# Patient Record
Sex: MALE | Race: ASIAN | NOT HISPANIC OR LATINO
[De-identification: names, ages, dates, MRNs, and addresses within clinical notes are randomized per-mention and may not be internally consistent; named-entity substitution may affect disease eponyms.]

---

## 2017-02-27 ENCOUNTER — LABORATORY RESULT (OUTPATIENT)
Age: 27
End: 2017-02-27

## 2017-02-27 ENCOUNTER — APPOINTMENT (OUTPATIENT)
Dept: NEUROLOGY | Facility: HOSPITAL | Age: 27
End: 2017-02-27

## 2017-02-27 ENCOUNTER — MEDICATION RENEWAL (OUTPATIENT)
Age: 27
End: 2017-02-27

## 2017-02-27 ENCOUNTER — OUTPATIENT (OUTPATIENT)
Dept: OUTPATIENT SERVICES | Facility: HOSPITAL | Age: 27
LOS: 1 days | End: 2017-02-27

## 2017-02-27 ENCOUNTER — RX RENEWAL (OUTPATIENT)
Age: 27
End: 2017-02-27

## 2017-02-27 VITALS — SYSTOLIC BLOOD PRESSURE: 116 MMHG | DIASTOLIC BLOOD PRESSURE: 76 MMHG | HEIGHT: 66 IN | HEART RATE: 90 BPM

## 2017-02-27 DIAGNOSIS — Z98.89 OTHER SPECIFIED POSTPROCEDURAL STATES: Chronic | ICD-10-CM

## 2017-02-27 LAB
CARBAMAZEPINE SERPL-MCNC: 6.7 UG/ML — SIGNIFICANT CHANGE UP (ref 4–12)
PHENOBARB SERPL-MCNC: 35.7 UG/ML — SIGNIFICANT CHANGE UP (ref 10–40)

## 2017-02-28 DIAGNOSIS — G80.9 CEREBRAL PALSY, UNSPECIFIED: ICD-10-CM

## 2017-02-28 DIAGNOSIS — G40.219 LOCALIZATION-RELATED (FOCAL) (PARTIAL) SYMPTOMATIC EPILEPSY AND EPILEPTIC SYNDROMES WITH COMPLEX PARTIAL SEIZURES, INTRACTABLE, WITHOUT STATUS EPILEPTICUS: ICD-10-CM

## 2017-03-08 ENCOUNTER — OUTPATIENT (OUTPATIENT)
Dept: OUTPATIENT SERVICES | Facility: HOSPITAL | Age: 27
LOS: 1 days | End: 2017-03-08
Payer: MEDICARE

## 2017-03-08 VITALS
HEART RATE: 69 BPM | DIASTOLIC BLOOD PRESSURE: 69 MMHG | OXYGEN SATURATION: 98 % | HEIGHT: 68 IN | WEIGHT: 164.91 LBS | RESPIRATION RATE: 16 BRPM | SYSTOLIC BLOOD PRESSURE: 134 MMHG | TEMPERATURE: 98 F

## 2017-03-08 DIAGNOSIS — K02.9 DENTAL CARIES, UNSPECIFIED: ICD-10-CM

## 2017-03-08 DIAGNOSIS — Z98.89 OTHER SPECIFIED POSTPROCEDURAL STATES: Chronic | ICD-10-CM

## 2017-03-08 DIAGNOSIS — F79 UNSPECIFIED INTELLECTUAL DISABILITIES: ICD-10-CM

## 2017-03-08 DIAGNOSIS — Z01.818 ENCOUNTER FOR OTHER PREPROCEDURAL EXAMINATION: ICD-10-CM

## 2017-03-08 DIAGNOSIS — K02.62 DENTAL CARIES ON SMOOTH SURFACE PENETRATING INTO DENTIN: ICD-10-CM

## 2017-03-08 DIAGNOSIS — Z98.890 OTHER SPECIFIED POSTPROCEDURAL STATES: Chronic | ICD-10-CM

## 2017-03-08 LAB
ANION GAP SERPL CALC-SCNC: 14 MMOL/L — SIGNIFICANT CHANGE UP (ref 5–17)
BUN SERPL-MCNC: 11 MG/DL — SIGNIFICANT CHANGE UP (ref 7–23)
CALCIUM SERPL-MCNC: 9.2 MG/DL — SIGNIFICANT CHANGE UP (ref 8.4–10.5)
CHLORIDE SERPL-SCNC: 99 MMOL/L — SIGNIFICANT CHANGE UP (ref 96–108)
CO2 SERPL-SCNC: 24 MMOL/L — SIGNIFICANT CHANGE UP (ref 22–31)
CREAT SERPL-MCNC: 0.63 MG/DL — SIGNIFICANT CHANGE UP (ref 0.5–1.3)
GLUCOSE SERPL-MCNC: 80 MG/DL — SIGNIFICANT CHANGE UP (ref 70–99)
HCT VFR BLD CALC: 43.1 % — SIGNIFICANT CHANGE UP (ref 39–50)
HGB BLD-MCNC: 14.3 G/DL — SIGNIFICANT CHANGE UP (ref 13–17)
MCHC RBC-ENTMCNC: 29.9 PG — SIGNIFICANT CHANGE UP (ref 27–34)
MCHC RBC-ENTMCNC: 33.2 GM/DL — SIGNIFICANT CHANGE UP (ref 32–36)
MCV RBC AUTO: 90.2 FL — SIGNIFICANT CHANGE UP (ref 80–100)
PLATELET # BLD AUTO: 260 K/UL — SIGNIFICANT CHANGE UP (ref 150–400)
POTASSIUM SERPL-MCNC: 3.8 MMOL/L — SIGNIFICANT CHANGE UP (ref 3.5–5.3)
POTASSIUM SERPL-SCNC: 3.8 MMOL/L — SIGNIFICANT CHANGE UP (ref 3.5–5.3)
RBC # BLD: 4.78 M/UL — SIGNIFICANT CHANGE UP (ref 4.2–5.8)
RBC # FLD: 13.5 % — SIGNIFICANT CHANGE UP (ref 10.3–14.5)
SODIUM SERPL-SCNC: 137 MMOL/L — SIGNIFICANT CHANGE UP (ref 135–145)
WBC # BLD: 5.62 K/UL — SIGNIFICANT CHANGE UP (ref 3.8–10.5)
WBC # FLD AUTO: 5.62 K/UL — SIGNIFICANT CHANGE UP (ref 3.8–10.5)

## 2017-03-08 PROCEDURE — 85027 COMPLETE CBC AUTOMATED: CPT

## 2017-03-08 PROCEDURE — 80048 BASIC METABOLIC PNL TOTAL CA: CPT

## 2017-03-08 NOTE — H&P PST ADULT - NEGATIVE GENERAL SYMPTOMS
no polyphagia/no polyuria/no polydipsia/no anorexia/no chills/no sweating/no malaise/no fever/no fatigue

## 2017-03-08 NOTE — H&P PST ADULT - HISTORY OF PRESENT ILLNESS
26 y.o. male with h/o mental retardation, cerebral palsy, epilepsy (typically has 1-2 episodes of GTCs every night while sleeping and occasional day time partial seizures) presents to PST with sister for pre-op evaluation for comprehensive dental treatment scheduled for 03/22/2017.

## 2017-03-08 NOTE — H&P PST ADULT - FAMILY HISTORY
Father  Still living? Yes, Estimated age: 61-70  Family history of diabetes mellitus, Age at diagnosis: Age Unknown

## 2017-03-08 NOTE — H&P PST ADULT - NSANTHOSAYNRD_GEN_A_CORE
No. ROLANDO screening performed.  STOP BANG Legend: 0-2 = LOW Risk; 3-4 = INTERMEDIATE Risk; 5-8 = HIGH Risk

## 2017-03-09 ENCOUNTER — CHART COPY (OUTPATIENT)
Age: 27
End: 2017-03-09

## 2017-03-14 ENCOUNTER — OTHER (OUTPATIENT)
Age: 27
End: 2017-03-14

## 2017-03-30 ENCOUNTER — APPOINTMENT (OUTPATIENT)
Dept: NEUROSURGERY | Facility: CLINIC | Age: 27
End: 2017-03-30

## 2017-03-30 VITALS
WEIGHT: 158 LBS | HEIGHT: 66 IN | BODY MASS INDEX: 25.39 KG/M2 | DIASTOLIC BLOOD PRESSURE: 72 MMHG | SYSTOLIC BLOOD PRESSURE: 118 MMHG | HEART RATE: 88 BPM

## 2017-05-10 ENCOUNTER — OUTPATIENT (OUTPATIENT)
Dept: OUTPATIENT SERVICES | Facility: HOSPITAL | Age: 27
LOS: 1 days | End: 2017-05-10
Payer: MEDICARE

## 2017-05-10 VITALS
HEART RATE: 88 BPM | WEIGHT: 160.06 LBS | RESPIRATION RATE: 20 BRPM | SYSTOLIC BLOOD PRESSURE: 102 MMHG | TEMPERATURE: 99 F | HEIGHT: 66 IN | DIASTOLIC BLOOD PRESSURE: 70 MMHG | OXYGEN SATURATION: 98 %

## 2017-05-10 DIAGNOSIS — G40.219 LOCALIZATION-RELATED (FOCAL) (PARTIAL) SYMPTOMATIC EPILEPSY AND EPILEPTIC SYNDROMES WITH COMPLEX PARTIAL SEIZURES, INTRACTABLE, WITHOUT STATUS EPILEPTICUS: ICD-10-CM

## 2017-05-10 DIAGNOSIS — Z98.890 OTHER SPECIFIED POSTPROCEDURAL STATES: Chronic | ICD-10-CM

## 2017-05-10 DIAGNOSIS — G40.909 EPILEPSY, UNSPECIFIED, NOT INTRACTABLE, WITHOUT STATUS EPILEPTICUS: ICD-10-CM

## 2017-05-10 DIAGNOSIS — Z01.818 ENCOUNTER FOR OTHER PREPROCEDURAL EXAMINATION: ICD-10-CM

## 2017-05-10 LAB
ANION GAP SERPL CALC-SCNC: 20 MMOL/L — HIGH (ref 5–17)
BUN SERPL-MCNC: 12 MG/DL — SIGNIFICANT CHANGE UP (ref 7–23)
CALCIUM SERPL-MCNC: 9.7 MG/DL — SIGNIFICANT CHANGE UP (ref 8.4–10.5)
CHLORIDE SERPL-SCNC: 99 MMOL/L — SIGNIFICANT CHANGE UP (ref 96–108)
CO2 SERPL-SCNC: 21 MMOL/L — LOW (ref 22–31)
CREAT SERPL-MCNC: 0.7 MG/DL — SIGNIFICANT CHANGE UP (ref 0.5–1.3)
GLUCOSE SERPL-MCNC: 83 MG/DL — SIGNIFICANT CHANGE UP (ref 70–99)
HCT VFR BLD CALC: 42.5 % — SIGNIFICANT CHANGE UP (ref 39–50)
HGB BLD-MCNC: 14.3 G/DL — SIGNIFICANT CHANGE UP (ref 13–17)
MCHC RBC-ENTMCNC: 29.9 PG — SIGNIFICANT CHANGE UP (ref 27–34)
MCHC RBC-ENTMCNC: 33.6 GM/DL — SIGNIFICANT CHANGE UP (ref 32–36)
MCV RBC AUTO: 88.9 FL — SIGNIFICANT CHANGE UP (ref 80–100)
PLATELET # BLD AUTO: 252 K/UL — SIGNIFICANT CHANGE UP (ref 150–400)
POTASSIUM SERPL-MCNC: 4 MMOL/L — SIGNIFICANT CHANGE UP (ref 3.5–5.3)
POTASSIUM SERPL-SCNC: 4 MMOL/L — SIGNIFICANT CHANGE UP (ref 3.5–5.3)
RBC # BLD: 4.78 M/UL — SIGNIFICANT CHANGE UP (ref 4.2–5.8)
RBC # FLD: 12.9 % — SIGNIFICANT CHANGE UP (ref 10.3–14.5)
SODIUM SERPL-SCNC: 140 MMOL/L — SIGNIFICANT CHANGE UP (ref 135–145)
WBC # BLD: 5.42 K/UL — SIGNIFICANT CHANGE UP (ref 3.8–10.5)
WBC # FLD AUTO: 5.42 K/UL — SIGNIFICANT CHANGE UP (ref 3.8–10.5)

## 2017-05-10 PROCEDURE — 80048 BASIC METABOLIC PNL TOTAL CA: CPT

## 2017-05-10 PROCEDURE — 85027 COMPLETE CBC AUTOMATED: CPT

## 2017-05-10 RX ORDER — PHENOBARBITAL 60 MG
1 TABLET ORAL
Qty: 0 | Refills: 0 | COMMUNITY

## 2017-05-10 NOTE — H&P PST ADULT - HISTORY OF PRESENT ILLNESS
26 y.o. male with h/o mental retardation, cerebral palsy, epilepsy (typically has 1-2 episodes of GTCs every night while sleeping and occasional day time partial seizures) presents to PST with sister for pre-op evaluation for comprehensive dental treatment scheduled for 03/22/2017. 26 y.o. male with h/o developmental delay, cerebral palsy, epilepsy (typically has 1-2 episodes of GTCs every night while sleeping and occasional day time partial seizures) presents to PST for vagal nerve stimulator for seizure control. Pt using wheelchair at PST, but can crawl at home. needs help with bathing, dressing and some help with feeding.

## 2017-05-10 NOTE — H&P PST ADULT - PMH
CP (cerebral palsy)    Dental caries    Epilepsy  GTCs, patrial seisures  MR (mental retardation) CP (cerebral palsy)    Dental caries    Epilepsy  GTCs, partial seisures  MR (mental retardation)

## 2017-05-17 ENCOUNTER — RX RENEWAL (OUTPATIENT)
Age: 27
End: 2017-05-17

## 2017-05-18 ENCOUNTER — MEDICATION RENEWAL (OUTPATIENT)
Age: 27
End: 2017-05-18

## 2017-05-22 ENCOUNTER — APPOINTMENT (OUTPATIENT)
Dept: NEUROLOGY | Facility: HOSPITAL | Age: 27
End: 2017-05-22

## 2017-05-22 ENCOUNTER — OUTPATIENT (OUTPATIENT)
Dept: OUTPATIENT SERVICES | Facility: HOSPITAL | Age: 27
LOS: 1 days | End: 2017-05-22
Payer: MEDICARE

## 2017-05-22 ENCOUNTER — TRANSCRIPTION ENCOUNTER (OUTPATIENT)
Age: 27
End: 2017-05-22

## 2017-05-22 ENCOUNTER — APPOINTMENT (OUTPATIENT)
Dept: NEUROSURGERY | Facility: HOSPITAL | Age: 27
End: 2017-05-22

## 2017-05-22 VITALS
OXYGEN SATURATION: 98 % | HEART RATE: 89 BPM | RESPIRATION RATE: 6 BRPM | DIASTOLIC BLOOD PRESSURE: 55 MMHG | TEMPERATURE: 97 F | SYSTOLIC BLOOD PRESSURE: 100 MMHG

## 2017-05-22 VITALS
DIASTOLIC BLOOD PRESSURE: 72 MMHG | HEART RATE: 92 BPM | OXYGEN SATURATION: 100 % | SYSTOLIC BLOOD PRESSURE: 104 MMHG | HEIGHT: 66 IN | RESPIRATION RATE: 18 BRPM | TEMPERATURE: 99 F | WEIGHT: 160.06 LBS

## 2017-05-22 DIAGNOSIS — G40.219 LOCALIZATION-RELATED (FOCAL) (PARTIAL) SYMPTOMATIC EPILEPSY AND EPILEPTIC SYNDROMES WITH COMPLEX PARTIAL SEIZURES, INTRACTABLE, WITHOUT STATUS EPILEPTICUS: ICD-10-CM

## 2017-05-22 DIAGNOSIS — Z98.890 OTHER SPECIFIED POSTPROCEDURAL STATES: Chronic | ICD-10-CM

## 2017-05-22 PROCEDURE — C1767: CPT

## 2017-05-22 PROCEDURE — 64568 OPN IMPLTJ CRNL NRV NEA&PG: CPT

## 2017-05-22 PROCEDURE — C1778: CPT

## 2017-05-22 PROCEDURE — 95974: CPT | Mod: 52

## 2017-05-22 RX ORDER — DEXTROSE MONOHYDRATE, SODIUM CHLORIDE, AND POTASSIUM CHLORIDE 50; .745; 4.5 G/1000ML; G/1000ML; G/1000ML
1000 INJECTION, SOLUTION INTRAVENOUS
Qty: 0 | Refills: 0 | Status: DISCONTINUED | OUTPATIENT
Start: 2017-05-22 | End: 2017-06-06

## 2017-05-22 RX ORDER — HYDROMORPHONE HYDROCHLORIDE 2 MG/ML
0.25 INJECTION INTRAMUSCULAR; INTRAVENOUS; SUBCUTANEOUS
Qty: 0 | Refills: 0 | Status: DISCONTINUED | OUTPATIENT
Start: 2017-05-22 | End: 2017-05-22

## 2017-05-22 RX ORDER — CEPHALEXIN 500 MG
500 CAPSULE ORAL EVERY 12 HOURS
Qty: 0 | Refills: 0 | Status: DISCONTINUED | OUTPATIENT
Start: 2017-05-22 | End: 2017-06-06

## 2017-05-22 RX ORDER — ONDANSETRON 8 MG/1
4 TABLET, FILM COATED ORAL ONCE
Qty: 0 | Refills: 0 | Status: DISCONTINUED | OUTPATIENT
Start: 2017-05-22 | End: 2017-05-22

## 2017-05-22 RX ORDER — CEPHALEXIN 500 MG
1 CAPSULE ORAL
Qty: 6 | Refills: 0
Start: 2017-05-22 | End: 2017-05-25

## 2017-05-22 RX ADMIN — DEXTROSE MONOHYDRATE, SODIUM CHLORIDE, AND POTASSIUM CHLORIDE 75 MILLILITER(S): 50; .745; 4.5 INJECTION, SOLUTION INTRAVENOUS at 11:40

## 2017-05-22 NOTE — ASU DISCHARGE PLAN (ADULT/PEDIATRIC). - NOTIFY
Inability to Tolerate Liquids or Foods/Bleeding that does not stop/Persistent Nausea and Vomiting/Fever greater than 101

## 2017-05-22 NOTE — ASU DISCHARGE PLAN (ADULT/PEDIATRIC). - MEDICATION SUMMARY - MEDICATIONS TO TAKE
I will START or STAY ON the medications listed below when I get home from the hospital:    Keppra  -- 1000milligram(s) by mouth in am  2000 mg at bedtime  -- Indication: For Localization-related symptomatic epilepsy and epileptic syndromes with complex partial seizures, intractable, without status epilepticus    Carbatrol 200 mg oral capsule, extended release  -- 1 cap(s) by mouth 2 times a day  -- Indication: For Localization-related symptomatic epilepsy and epileptic syndromes with complex partial seizures, intractable, without status epilepticus    PHENobarbital 97.2 mg oral tablet  -- 1 tab(s) by mouth 2 times a day  -- Indication: For Localization-related symptomatic epilepsy and epileptic syndromes with complex partial seizures, intractable, without status epilepticus I will START or STAY ON the medications listed below when I get home from the hospital:    Keppra  -- 1000milligram(s) by mouth in am  2000 mg at bedtime  -- Indication: For Localization-related symptomatic epilepsy and epileptic syndromes with complex partial seizures, intractable, without status epilepticus    Carbatrol 200 mg oral capsule, extended release  -- 1 cap(s) by mouth 2 times a day  -- Indication: For Localization-related symptomatic epilepsy and epileptic syndromes with complex partial seizures, intractable, without status epilepticus    PHENobarbital 97.2 mg oral tablet  -- 1 tab(s) by mouth 2 times a day  -- Indication: For Localization-related symptomatic epilepsy and epileptic syndromes with complex partial seizures, intractable, without status epilepticus    Keflex 500 mg oral capsule  -- 1 cap(s) by mouth every 12 hours MDD:2  tablets  -- Indication: For Localization-related symptomatic epilepsy and epileptic syndromes with complex partial seizures, intractable, without status epilepticus

## 2017-05-22 NOTE — ASU DISCHARGE PLAN (ADULT/PEDIATRIC). - NURSING INSTRUCTIONS
family understand discharge instructions and verbalized understanding. pt safety maintained and has met discharge criteria.

## 2017-05-22 NOTE — BRIEF OPERATIVE NOTE - PRE-OP DX
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

## 2017-06-01 ENCOUNTER — APPOINTMENT (OUTPATIENT)
Dept: NEUROSURGERY | Facility: CLINIC | Age: 27
End: 2017-06-01

## 2017-06-01 VITALS — DIASTOLIC BLOOD PRESSURE: 74 MMHG | HEART RATE: 68 BPM | SYSTOLIC BLOOD PRESSURE: 120 MMHG

## 2017-06-04 ENCOUNTER — TRANSCRIPTION ENCOUNTER (OUTPATIENT)
Age: 27
End: 2017-06-04

## 2017-06-26 ENCOUNTER — APPOINTMENT (OUTPATIENT)
Dept: NEUROLOGY | Facility: HOSPITAL | Age: 27
End: 2017-06-26

## 2017-06-26 ENCOUNTER — OUTPATIENT (OUTPATIENT)
Dept: OUTPATIENT SERVICES | Facility: HOSPITAL | Age: 27
LOS: 1 days | End: 2017-06-26

## 2017-06-26 VITALS — HEIGHT: 66 IN | SYSTOLIC BLOOD PRESSURE: 99 MMHG | HEART RATE: 68 BPM | DIASTOLIC BLOOD PRESSURE: 57 MMHG

## 2017-06-26 DIAGNOSIS — R62.50 UNSPECIFIED LACK OF EXPECTED NORMAL PHYSIOLOGICAL DEVELOPMENT IN CHILDHOOD: ICD-10-CM

## 2017-06-26 DIAGNOSIS — Z98.890 OTHER SPECIFIED POSTPROCEDURAL STATES: Chronic | ICD-10-CM

## 2017-06-27 DIAGNOSIS — R62.50 UNSPECIFIED LACK OF EXPECTED NORMAL PHYSIOLOGICAL DEVELOPMENT IN CHILDHOOD: ICD-10-CM

## 2017-06-27 DIAGNOSIS — G40.909 EPILEPSY, UNSPECIFIED, NOT INTRACTABLE, WITHOUT STATUS EPILEPTICUS: ICD-10-CM

## 2017-07-17 ENCOUNTER — RX RENEWAL (OUTPATIENT)
Age: 27
End: 2017-07-17

## 2017-08-28 ENCOUNTER — APPOINTMENT (OUTPATIENT)
Dept: NEUROLOGY | Facility: HOSPITAL | Age: 27
End: 2017-08-28

## 2017-09-25 ENCOUNTER — OUTPATIENT (OUTPATIENT)
Dept: OUTPATIENT SERVICES | Facility: HOSPITAL | Age: 27
LOS: 1 days | End: 2017-09-25

## 2017-09-25 ENCOUNTER — APPOINTMENT (OUTPATIENT)
Dept: NEUROLOGY | Facility: HOSPITAL | Age: 27
End: 2017-09-25

## 2017-09-25 VITALS
HEART RATE: 68 BPM | BODY MASS INDEX: 26.66 KG/M2 | SYSTOLIC BLOOD PRESSURE: 116 MMHG | DIASTOLIC BLOOD PRESSURE: 67 MMHG | WEIGHT: 160 LBS | HEIGHT: 65 IN

## 2017-09-25 DIAGNOSIS — G40.909 EPILEPSY, UNSPECIFIED, NOT INTRACTABLE, WITHOUT STATUS EPILEPTICUS: ICD-10-CM

## 2017-09-25 DIAGNOSIS — Z98.890 OTHER SPECIFIED POSTPROCEDURAL STATES: Chronic | ICD-10-CM

## 2017-10-04 ENCOUNTER — APPOINTMENT (OUTPATIENT)
Dept: NEUROLOGY | Facility: CLINIC | Age: 27
End: 2017-10-04

## 2017-10-04 ENCOUNTER — APPOINTMENT (OUTPATIENT)
Age: 27
End: 2017-10-04
Payer: MEDICARE

## 2017-10-04 VITALS — SYSTOLIC BLOOD PRESSURE: 115 MMHG | DIASTOLIC BLOOD PRESSURE: 70 MMHG | HEART RATE: 74 BPM

## 2017-10-04 PROCEDURE — 95974: CPT

## 2017-10-04 PROCEDURE — 99214 OFFICE O/P EST MOD 30 MIN: CPT | Mod: 25

## 2017-10-11 ENCOUNTER — APPOINTMENT (OUTPATIENT)
Dept: NEUROLOGY | Facility: CLINIC | Age: 27
End: 2017-10-11

## 2017-10-25 ENCOUNTER — APPOINTMENT (OUTPATIENT)
Dept: NEUROLOGY | Facility: CLINIC | Age: 27
End: 2017-10-25
Payer: MEDICARE

## 2017-10-25 VITALS
BODY MASS INDEX: 26.66 KG/M2 | HEART RATE: 84 BPM | WEIGHT: 160 LBS | SYSTOLIC BLOOD PRESSURE: 154 MMHG | DIASTOLIC BLOOD PRESSURE: 75 MMHG | HEIGHT: 65 IN

## 2017-10-25 PROCEDURE — 99214 OFFICE O/P EST MOD 30 MIN: CPT

## 2017-10-25 PROCEDURE — 95974: CPT

## 2017-11-09 NOTE — H&P PST ADULT - HEART RATE (BEATS/MIN)
no dysuria/no abd pain, no urinary frequency/no vomiting/no hematuria/no nausea/no burning urination 88

## 2017-11-22 ENCOUNTER — APPOINTMENT (OUTPATIENT)
Dept: NEUROLOGY | Facility: CLINIC | Age: 27
End: 2017-11-22

## 2017-12-04 ENCOUNTER — APPOINTMENT (OUTPATIENT)
Dept: NEUROLOGY | Facility: CLINIC | Age: 27
End: 2017-12-04
Payer: MEDICARE

## 2017-12-04 VITALS
HEIGHT: 65 IN | SYSTOLIC BLOOD PRESSURE: 106 MMHG | HEART RATE: 76 BPM | DIASTOLIC BLOOD PRESSURE: 63 MMHG | WEIGHT: 165 LBS | BODY MASS INDEX: 27.49 KG/M2

## 2017-12-04 PROCEDURE — 95974: CPT

## 2017-12-04 PROCEDURE — 99214 OFFICE O/P EST MOD 30 MIN: CPT

## 2017-12-20 ENCOUNTER — APPOINTMENT (OUTPATIENT)
Dept: NEUROLOGY | Facility: CLINIC | Age: 27
End: 2017-12-20

## 2018-01-24 ENCOUNTER — APPOINTMENT (OUTPATIENT)
Dept: NEUROLOGY | Facility: CLINIC | Age: 28
End: 2018-01-24
Payer: MEDICARE

## 2018-01-24 VITALS
HEART RATE: 78 BPM | HEIGHT: 66 IN | BODY MASS INDEX: 26.52 KG/M2 | DIASTOLIC BLOOD PRESSURE: 71 MMHG | SYSTOLIC BLOOD PRESSURE: 107 MMHG | WEIGHT: 165 LBS

## 2018-01-24 PROCEDURE — 95974: CPT

## 2018-01-24 PROCEDURE — 99214 OFFICE O/P EST MOD 30 MIN: CPT

## 2018-01-31 ENCOUNTER — RX RENEWAL (OUTPATIENT)
Age: 28
End: 2018-01-31

## 2018-02-09 ENCOUNTER — APPOINTMENT (OUTPATIENT)
Dept: NEUROLOGY | Facility: CLINIC | Age: 28
End: 2018-02-09

## 2018-02-28 ENCOUNTER — APPOINTMENT (OUTPATIENT)
Dept: NEUROLOGY | Facility: CLINIC | Age: 28
End: 2018-02-28

## 2018-02-28 ENCOUNTER — APPOINTMENT (OUTPATIENT)
Dept: NEUROLOGY | Facility: CLINIC | Age: 28
End: 2018-02-28
Payer: MEDICARE

## 2018-02-28 VITALS
HEIGHT: 66 IN | HEART RATE: 66 BPM | DIASTOLIC BLOOD PRESSURE: 58 MMHG | SYSTOLIC BLOOD PRESSURE: 96 MMHG | WEIGHT: 165 LBS | BODY MASS INDEX: 26.52 KG/M2

## 2018-02-28 PROCEDURE — 99214 OFFICE O/P EST MOD 30 MIN: CPT

## 2018-04-05 ENCOUNTER — APPOINTMENT (OUTPATIENT)
Dept: NEUROLOGY | Facility: CLINIC | Age: 28
End: 2018-04-05
Payer: MEDICARE

## 2018-04-05 VITALS
HEIGHT: 66 IN | DIASTOLIC BLOOD PRESSURE: 72 MMHG | WEIGHT: 165 LBS | HEART RATE: 80 BPM | SYSTOLIC BLOOD PRESSURE: 107 MMHG | BODY MASS INDEX: 26.52 KG/M2

## 2018-04-05 PROCEDURE — 99214 OFFICE O/P EST MOD 30 MIN: CPT

## 2018-05-11 ENCOUNTER — APPOINTMENT (OUTPATIENT)
Dept: NEUROLOGY | Facility: CLINIC | Age: 28
End: 2018-05-11
Payer: MEDICARE

## 2018-05-11 VITALS
SYSTOLIC BLOOD PRESSURE: 107 MMHG | BODY MASS INDEX: 26.52 KG/M2 | HEART RATE: 72 BPM | WEIGHT: 165 LBS | DIASTOLIC BLOOD PRESSURE: 77 MMHG | HEIGHT: 66 IN

## 2018-05-11 PROCEDURE — 99215 OFFICE O/P EST HI 40 MIN: CPT

## 2018-05-11 PROCEDURE — 95974: CPT

## 2018-08-01 ENCOUNTER — MEDICATION RENEWAL (OUTPATIENT)
Age: 28
End: 2018-08-01

## 2018-08-21 ENCOUNTER — RX RENEWAL (OUTPATIENT)
Age: 28
End: 2018-08-21

## 2018-08-28 ENCOUNTER — RX RENEWAL (OUTPATIENT)
Age: 28
End: 2018-08-28

## 2018-08-31 ENCOUNTER — RX RENEWAL (OUTPATIENT)
Age: 28
End: 2018-08-31

## 2018-10-29 ENCOUNTER — RX RENEWAL (OUTPATIENT)
Age: 28
End: 2018-10-29

## 2018-11-21 ENCOUNTER — RX RENEWAL (OUTPATIENT)
Age: 28
End: 2018-11-21

## 2018-12-10 ENCOUNTER — APPOINTMENT (OUTPATIENT)
Dept: NEUROLOGY | Facility: CLINIC | Age: 28
End: 2018-12-10
Payer: MEDICARE

## 2018-12-10 VITALS
BODY MASS INDEX: 26.52 KG/M2 | HEART RATE: 77 BPM | DIASTOLIC BLOOD PRESSURE: 66 MMHG | WEIGHT: 165 LBS | HEIGHT: 66 IN | SYSTOLIC BLOOD PRESSURE: 110 MMHG

## 2018-12-10 PROCEDURE — 99214 OFFICE O/P EST MOD 30 MIN: CPT

## 2018-12-10 PROCEDURE — 95970 ALYS NPGT W/O PRGRMG: CPT

## 2019-01-17 ENCOUNTER — RX RENEWAL (OUTPATIENT)
Age: 29
End: 2019-01-17

## 2019-01-22 ENCOUNTER — RX RENEWAL (OUTPATIENT)
Age: 29
End: 2019-01-22

## 2019-02-19 ENCOUNTER — RX RENEWAL (OUTPATIENT)
Age: 29
End: 2019-02-19

## 2019-02-19 ENCOUNTER — MEDICATION RENEWAL (OUTPATIENT)
Age: 29
End: 2019-02-19

## 2019-04-18 ENCOUNTER — RX RENEWAL (OUTPATIENT)
Age: 29
End: 2019-04-18

## 2019-05-29 ENCOUNTER — APPOINTMENT (OUTPATIENT)
Dept: NEUROLOGY | Facility: CLINIC | Age: 29
End: 2019-05-29

## 2019-06-12 ENCOUNTER — MESSAGE (OUTPATIENT)
Age: 29
End: 2019-06-12

## 2019-07-10 ENCOUNTER — OUTPATIENT (OUTPATIENT)
Dept: OUTPATIENT SERVICES | Facility: HOSPITAL | Age: 29
LOS: 1 days | End: 2019-07-10
Payer: MEDICARE

## 2019-07-10 VITALS
DIASTOLIC BLOOD PRESSURE: 70 MMHG | HEIGHT: 66 IN | TEMPERATURE: 98 F | SYSTOLIC BLOOD PRESSURE: 106 MMHG | WEIGHT: 160.06 LBS | OXYGEN SATURATION: 100 % | RESPIRATION RATE: 20 BRPM | HEART RATE: 65 BPM

## 2019-07-10 DIAGNOSIS — Z98.890 OTHER SPECIFIED POSTPROCEDURAL STATES: Chronic | ICD-10-CM

## 2019-07-10 DIAGNOSIS — K02.9 DENTAL CARIES, UNSPECIFIED: ICD-10-CM

## 2019-07-10 DIAGNOSIS — K05.6 PERIODONTAL DISEASE, UNSPECIFIED: ICD-10-CM

## 2019-07-10 DIAGNOSIS — Z96.89 PRESENCE OF OTHER SPECIFIED FUNCTIONAL IMPLANTS: Chronic | ICD-10-CM

## 2019-07-10 DIAGNOSIS — K02.62 DENTAL CARIES ON SMOOTH SURFACE PENETRATING INTO DENTIN: ICD-10-CM

## 2019-07-10 DIAGNOSIS — R56.9 UNSPECIFIED CONVULSIONS: ICD-10-CM

## 2019-07-10 DIAGNOSIS — Z98.811 DENTAL RESTORATION STATUS: Chronic | ICD-10-CM

## 2019-07-10 LAB
ANION GAP SERPL CALC-SCNC: 13 MMOL/L — SIGNIFICANT CHANGE UP (ref 5–17)
BUN SERPL-MCNC: 8 MG/DL — SIGNIFICANT CHANGE UP (ref 7–23)
CALCIUM SERPL-MCNC: 8.7 MG/DL — SIGNIFICANT CHANGE UP (ref 8.4–10.5)
CHLORIDE SERPL-SCNC: 101 MMOL/L — SIGNIFICANT CHANGE UP (ref 96–108)
CO2 SERPL-SCNC: 23 MMOL/L — SIGNIFICANT CHANGE UP (ref 22–31)
CREAT SERPL-MCNC: 0.57 MG/DL — SIGNIFICANT CHANGE UP (ref 0.5–1.3)
GLUCOSE SERPL-MCNC: 84 MG/DL — SIGNIFICANT CHANGE UP (ref 70–99)
HCT VFR BLD CALC: 41.2 % — SIGNIFICANT CHANGE UP (ref 39–50)
HGB BLD-MCNC: 13.6 G/DL — SIGNIFICANT CHANGE UP (ref 13–17)
MCHC RBC-ENTMCNC: 29.9 PG — SIGNIFICANT CHANGE UP (ref 27–34)
MCHC RBC-ENTMCNC: 33 GM/DL — SIGNIFICANT CHANGE UP (ref 32–36)
MCV RBC AUTO: 90.5 FL — SIGNIFICANT CHANGE UP (ref 80–100)
PLATELET # BLD AUTO: 263 K/UL — SIGNIFICANT CHANGE UP (ref 150–400)
POTASSIUM SERPL-MCNC: 3.8 MMOL/L — SIGNIFICANT CHANGE UP (ref 3.5–5.3)
POTASSIUM SERPL-SCNC: 3.8 MMOL/L — SIGNIFICANT CHANGE UP (ref 3.5–5.3)
RBC # BLD: 4.55 M/UL — SIGNIFICANT CHANGE UP (ref 4.2–5.8)
RBC # FLD: 13 % — SIGNIFICANT CHANGE UP (ref 10.3–14.5)
SODIUM SERPL-SCNC: 137 MMOL/L — SIGNIFICANT CHANGE UP (ref 135–145)
WBC # BLD: 5.56 K/UL — SIGNIFICANT CHANGE UP (ref 3.8–10.5)
WBC # FLD AUTO: 5.56 K/UL — SIGNIFICANT CHANGE UP (ref 3.8–10.5)

## 2019-07-10 PROCEDURE — 85027 COMPLETE CBC AUTOMATED: CPT

## 2019-07-10 PROCEDURE — G0463: CPT

## 2019-07-10 PROCEDURE — 80048 BASIC METABOLIC PNL TOTAL CA: CPT

## 2019-07-10 RX ORDER — CARBAMAZEPINE 200 MG
1 TABLET ORAL
Qty: 0 | Refills: 0 | DISCHARGE

## 2019-07-10 RX ORDER — LEVETIRACETAM 250 MG/1
3000 TABLET, FILM COATED ORAL
Qty: 0 | Refills: 0 | DISCHARGE

## 2019-07-10 RX ORDER — PHENOBARBITAL 60 MG
1 TABLET ORAL
Qty: 0 | Refills: 0 | DISCHARGE

## 2019-07-10 NOTE — H&P PST ADULT - NSICDXPROBLEM_GEN_ALL_CORE_FT
PROBLEM DIAGNOSES  Problem: Dental caries  Assessment and Plan:  Comprehensive dental treatement on 7/12/19.       Problem: Seizures  Assessment and Plan: Seizure precautions

## 2019-07-10 NOTE — H&P PST ADULT - NSICDXPASTSURGICALHX_GEN_ALL_CORE_FT
PAST SURGICAL HISTORY:  H/O knee surgery b/l, for contractures - 2009    S/P placement of VNS (vagus nerve stimulation) device 2017 PAST SURGICAL HISTORY:  H/O knee surgery b/l, for contractures - 2009    S/P dental restoration     S/P placement of VNS (vagus nerve stimulation) device 2017

## 2019-07-10 NOTE — H&P PST ADULT - HISTORY OF PRESENT ILLNESS
26 y.o. male with h/o developmental delay, cerebral palsy, epilepsy (typically has 1-2 episodes of GTCs every night while sleeping and occasional day time partial seizures) - vagal nerve stimulator for seizure control. 26 y.o. male with h/o developmental delay, cerebral palsy, epilepsy (typically has 1-2 episodes of GTCs every night while sleeping and occasional day time partial seizures) - vagal nerve stimulator for seizure control. with dental caries , Coming in for Comprehensive dental treatement on 7/12/19. 26 y.o. male with h/o developmental delay, cerebral palsy, epilepsy- vagal nerve stimulator for seizure control. with dental caries , Coming in for Comprehensive dental treatement on 7/12/19.

## 2019-07-10 NOTE — H&P PST ADULT - NSICDXFAMILYHX_GEN_ALL_CORE_FT
FAMILY HISTORY:  Father  Still living? Yes, Estimated age: 61-70  Family history of diabetes mellitus, Age at diagnosis: Age Unknown

## 2019-07-10 NOTE — H&P PST ADULT - NSICDXPASTMEDICALHX_GEN_ALL_CORE_FT
PAST MEDICAL HISTORY:  CP (cerebral palsy)     Dental caries     Epilepsy GTCs, partial seisures    MR (mental retardation) PAST MEDICAL HISTORY:  CP (cerebral palsy)     Dental caries     Epilepsy GTCs, partial seizures    MR (mental retardation)

## 2019-07-11 ENCOUNTER — TRANSCRIPTION ENCOUNTER (OUTPATIENT)
Age: 29
End: 2019-07-11

## 2019-07-11 ENCOUNTER — RX RENEWAL (OUTPATIENT)
Age: 29
End: 2019-07-11

## 2019-07-11 NOTE — ASU DISCHARGE PLAN (ADULT/PEDIATRIC) - CALL YOUR DOCTOR IF YOU HAVE ANY OF THE FOLLOWING:
Pain not relieved by Medications/Swelling that gets worse/Wound/Surgical Site with redness, or foul smelling discharge or pus/Fever greater than (need to indicate Fahrenheit or Celsius)/Bleeding that does not stop

## 2019-07-12 ENCOUNTER — OUTPATIENT (OUTPATIENT)
Dept: OUTPATIENT SERVICES | Facility: HOSPITAL | Age: 29
LOS: 1 days | End: 2019-07-12
Payer: MEDICARE

## 2019-07-12 ENCOUNTER — RX RENEWAL (OUTPATIENT)
Age: 29
End: 2019-07-12

## 2019-07-12 VITALS
DIASTOLIC BLOOD PRESSURE: 68 MMHG | RESPIRATION RATE: 20 BRPM | OXYGEN SATURATION: 95 % | SYSTOLIC BLOOD PRESSURE: 102 MMHG | HEIGHT: 66 IN | TEMPERATURE: 98 F | WEIGHT: 160.06 LBS | HEART RATE: 83 BPM

## 2019-07-12 VITALS
TEMPERATURE: 97 F | OXYGEN SATURATION: 100 % | DIASTOLIC BLOOD PRESSURE: 84 MMHG | HEART RATE: 83 BPM | RESPIRATION RATE: 16 BRPM | SYSTOLIC BLOOD PRESSURE: 129 MMHG

## 2019-07-12 DIAGNOSIS — K02.62 DENTAL CARIES ON SMOOTH SURFACE PENETRATING INTO DENTIN: ICD-10-CM

## 2019-07-12 DIAGNOSIS — Z96.89 PRESENCE OF OTHER SPECIFIED FUNCTIONAL IMPLANTS: Chronic | ICD-10-CM

## 2019-07-12 DIAGNOSIS — Z98.811 DENTAL RESTORATION STATUS: Chronic | ICD-10-CM

## 2019-07-12 DIAGNOSIS — K05.6 PERIODONTAL DISEASE, UNSPECIFIED: ICD-10-CM

## 2019-07-12 DIAGNOSIS — Z98.890 OTHER SPECIFIED POSTPROCEDURAL STATES: Chronic | ICD-10-CM

## 2019-07-12 PROCEDURE — D4341: CPT

## 2019-07-12 PROCEDURE — D2394: CPT

## 2019-07-12 PROCEDURE — D4355: CPT

## 2019-07-12 PROCEDURE — D4210: CPT

## 2019-07-12 RX ORDER — SODIUM CHLORIDE 9 MG/ML
1000 INJECTION, SOLUTION INTRAVENOUS
Refills: 0 | Status: DISCONTINUED | OUTPATIENT
Start: 2019-07-12 | End: 2019-07-27

## 2019-07-12 RX ORDER — ONDANSETRON 8 MG/1
4 TABLET, FILM COATED ORAL ONCE
Refills: 0 | Status: DISCONTINUED | OUTPATIENT
Start: 2019-07-12 | End: 2019-07-27

## 2019-07-12 NOTE — PRE-ANESTHESIA EVALUATION ADULT - NSANTHPMHFT_GEN_ALL_CORE
Patient has VNS. Family states we can place magnet on device intra op and take off post op and will return to normal function.

## 2019-07-12 NOTE — ASU PATIENT PROFILE, ADULT - PSH
H/O knee surgery  b/l, for contractures - 2009  S/P dental restoration    S/P placement of VNS (vagus nerve stimulation) device  2017

## 2019-07-12 NOTE — ASU PATIENT PROFILE, ADULT - TEACHING/LEARNING FACTORS IMPACT ABILITY TO LEARN
communication limitations/learning disabilities/comprehension/language/physical limitations/cognitive limitations

## 2019-07-13 ENCOUNTER — TRANSCRIPTION ENCOUNTER (OUTPATIENT)
Age: 29
End: 2019-07-13

## 2019-08-06 ENCOUNTER — MEDICATION RENEWAL (OUTPATIENT)
Age: 29
End: 2019-08-06

## 2019-08-14 RX ORDER — LIDOCAINE HCL 20 MG/ML
0.2 VIAL (ML) INJECTION ONCE
Refills: 0 | Status: DISCONTINUED | OUTPATIENT
Start: 2019-08-16 | End: 2019-08-31

## 2019-08-14 RX ORDER — SODIUM CHLORIDE 9 MG/ML
3 INJECTION INTRAMUSCULAR; INTRAVENOUS; SUBCUTANEOUS EVERY 8 HOURS
Refills: 0 | Status: DISCONTINUED | OUTPATIENT
Start: 2019-08-16 | End: 2019-08-31

## 2019-08-15 ENCOUNTER — TRANSCRIPTION ENCOUNTER (OUTPATIENT)
Age: 29
End: 2019-08-15

## 2019-08-16 ENCOUNTER — OUTPATIENT (OUTPATIENT)
Dept: OUTPATIENT SERVICES | Facility: HOSPITAL | Age: 29
LOS: 1 days | End: 2019-08-16
Payer: MEDICARE

## 2019-08-16 VITALS
TEMPERATURE: 98 F | OXYGEN SATURATION: 100 % | RESPIRATION RATE: 20 BRPM | HEIGHT: 66 IN | HEART RATE: 78 BPM | WEIGHT: 160.06 LBS | DIASTOLIC BLOOD PRESSURE: 54 MMHG | SYSTOLIC BLOOD PRESSURE: 93 MMHG

## 2019-08-16 VITALS
OXYGEN SATURATION: 100 % | RESPIRATION RATE: 22 BRPM | SYSTOLIC BLOOD PRESSURE: 104 MMHG | DIASTOLIC BLOOD PRESSURE: 72 MMHG | HEART RATE: 90 BPM

## 2019-08-16 DIAGNOSIS — Z98.890 OTHER SPECIFIED POSTPROCEDURAL STATES: Chronic | ICD-10-CM

## 2019-08-16 DIAGNOSIS — Z96.89 PRESENCE OF OTHER SPECIFIED FUNCTIONAL IMPLANTS: Chronic | ICD-10-CM

## 2019-08-16 DIAGNOSIS — K05.6 PERIODONTAL DISEASE, UNSPECIFIED: ICD-10-CM

## 2019-08-16 DIAGNOSIS — Z01.818 ENCOUNTER FOR OTHER PREPROCEDURAL EXAMINATION: ICD-10-CM

## 2019-08-16 DIAGNOSIS — K02.62 DENTAL CARIES ON SMOOTH SURFACE PENETRATING INTO DENTIN: ICD-10-CM

## 2019-08-16 DIAGNOSIS — Z98.811 DENTAL RESTORATION STATUS: Chronic | ICD-10-CM

## 2019-08-16 PROCEDURE — D4341: CPT

## 2019-08-16 PROCEDURE — D2335: CPT

## 2019-08-16 NOTE — PRE-ANESTHESIA EVALUATION ADULT - NSANTHOSAYNRD_GEN_A_CORE
No. ROLANDO screening performed.  STOP BANG Legend: 0-2 = LOW Risk; 3-4 = INTERMEDIATE Risk; 5-8 = HIGH Risk
IVF's

## 2019-09-03 ENCOUNTER — APPOINTMENT (OUTPATIENT)
Dept: NEUROLOGY | Facility: CLINIC | Age: 29
End: 2019-09-03
Payer: MEDICARE

## 2019-09-03 VITALS
SYSTOLIC BLOOD PRESSURE: 108 MMHG | HEIGHT: 66 IN | BODY MASS INDEX: 26.52 KG/M2 | WEIGHT: 165 LBS | HEART RATE: 67 BPM | DIASTOLIC BLOOD PRESSURE: 71 MMHG

## 2019-09-03 PROCEDURE — 99214 OFFICE O/P EST MOD 30 MIN: CPT

## 2019-09-03 PROCEDURE — 95970 ALYS NPGT W/O PRGRMG: CPT

## 2019-09-03 NOTE — PROCEDURE
[FreeTextEntry5] : 1.75 [FreeTextEntry6] : 30 [FreeTextEntry7] : 500 [FreeTextEntry8] : 30 [de-identified] : 2.00 [FreeTextEntry9] : 5.0 [de-identified] : 60 [de-identified] : 500 [de-identified] : 084 [de-identified] : 60 [de-identified] : 1.873 [de-identified] : 114 [de-identified] : 8 [de-identified] : 30->40% [FreeTextEntry4] : VNS Information: [employer] Model 220 : LOT number [id number]\par

## 2019-09-03 NOTE — DATA REVIEWED
[de-identified] : EEG monitoring showed One generalized tonic seizure recorded with poor lateralization 2/2017

## 2019-09-03 NOTE — HISTORY OF PRESENT ILLNESS
[FreeTextEntry1] : ***UPDATE: 9/3/19**\par Patient accompanied by mother \par He is doing very well with no interval seizures.\par His mother is very happy with the VNS\par \par UPDATE: 12/10/18\par Doing very well since last visit in May 2018 (has only very brief tonic seizures when stressed or excited)\par Mother very happy with VNS results - does not want to make any changes with AED regimen at this time\par \par Current AED: Carbamazepine 200mg q12h, Keppra 1000mg am and 2000 pm, Phenobarbital 97.2mg BID\par \par Past treatment has included carbamazepine (Tegretol), divalproex (Depakote), levetiracetam (Keppra) and phenobarbital. \par \par Updates: events when stressed  x 2 since last office visit\par \par PMHX: 27 year old man with history MR/CP presents for follow up of medically refractory generalized epilepsy. Pt is status post VNS implantation on 5/25/17.\par Since then he has had a "60% decrease" in seizure frequency/severity. Mother is happy with results\par GTC description with associated tongue biting.  mainly nocturnal lasting 20-40 seconds described as generalized shaking with mouth drooling. Frequency is twice a night at baseline. \par \par The patient started having seizures shortly after birth. His "midwife tried giving him pills" which led to hypoxic injury. \par

## 2019-09-03 NOTE — PHYSICAL EXAM
[FreeTextEntry1] : Constitutional: alert and in no acute distress. \par Psychiatric:. at baseline. \par Neurologic: \par Orientation: oriented to person. \par Cranial Nerves: smell intact bilaterally, visual acuity poor, but can  make out face of mom.  pupils equal round and reactive to light, extraocular motion intact, facial sensation intact symmetrically, face symmetrical, hearing was intact bilaterally, tongue and palate midline, head turning and shoulder shrug symmetric and there was no tongue deviation with protrusion. \par Motor Strength:. there was weakness in both upper extremities. there was weakness in both lower extremities. increased tone \par Sensory exam: light touch was intact. \par Coordination: Non-ambulatory. \par Deep tendon reflexes: \par Biceps right 3+. Biceps left 3+.  \par Triceps right 3+. Triceps left 3+.  \par Brachioradialis right 3+. Brachioradialis left 3+.  \par Patella right 3+. Patella left 3+.  \par Eyes: the sclera and conjunctiva were normal and pupils were equal in size, round, reactive to light, with normal accommodation. \par ENT: the ears and nose were normal in appearance. \par Pulmonary: no respiratory distress, normal respiratory rhythm and effort and no accessory muscle use. \par Heart: heart rate was normal and rhythm regular. \par Vascular:. there was no peripheral edema. \par Abdomen:. no distention. \par Musculoskeletal: no involuntary movements were seen and no joint swelling seen . non ambulatory. \par Skin: normal skin color and pigmentation and no rash. \par \par

## 2019-09-03 NOTE — DISCUSSION/SUMMARY
[Safety Recommendations] : The patient was advised in regards to the risk of seizures and general seizure safety recommendations including not to be bathing alone, climbing to high places and operating heavy machinery. [Compliance with Medications] : The importance of compliance with medications was reinforced. [Medication Side Effects] : High frequency and serious potential medication adverse effects were reviewed with the patient, including but not exclusive to psychiatric effects.  Information sheets on medication side effects were made available to the patient in our clinic.  The patient or advocate agrees to notify us for any concerns. [Sleep Hygiene/Sleep Disruption Risks] : Sleep hygiene and the risks of sleep disruption were discussed. [Risk of Death] : Risk of death associated with seizures / SUDEP was discussed. [FreeTextEntry1] : 29 year old man with history MR/CP presents for follow up of refractory epilepsy.\par EEG monitoring showed One generalized tonic seizure recorded with poor lateralization 2/2017. 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 There is minimal genu and splenium remnant of the corpus callosum. \par s/p VNS implant 5/2017 with good results\par \par Plan:\par - continue current AED regimen\par -reviewed seizure triggers\par -interrogated VNS. \par - will discuss with PCP next month in reference to second opinion for botox with mov d/o for  comfort/spasticity.\par - follow up with Dr Keith in 6 months (call if any concerns)

## 2019-09-03 NOTE — CONSULT LETTER
[Sammy Keith MD] : Sammy Keith MD [Attending, Dept. of Neurology, Division of Epilepsy] : Attending, Dept. of Neurology, Division of Epilepsy [[facility]] : [facility] [ of Neurology] :  of Neurology [[facility]] : [facility]

## 2019-09-12 ENCOUNTER — RX RENEWAL (OUTPATIENT)
Age: 29
End: 2019-09-12

## 2019-10-10 ENCOUNTER — MEDICATION RENEWAL (OUTPATIENT)
Age: 29
End: 2019-10-10

## 2019-12-08 ENCOUNTER — RX RENEWAL (OUTPATIENT)
Age: 29
End: 2019-12-08

## 2019-12-09 ENCOUNTER — RX RENEWAL (OUTPATIENT)
Age: 29
End: 2019-12-09

## 2020-02-13 ENCOUNTER — RX RENEWAL (OUTPATIENT)
Age: 30
End: 2020-02-13

## 2020-03-13 ENCOUNTER — RX RENEWAL (OUTPATIENT)
Age: 30
End: 2020-03-13

## 2020-03-24 ENCOUNTER — APPOINTMENT (OUTPATIENT)
Dept: NEUROLOGY | Facility: CLINIC | Age: 30
End: 2020-03-24

## 2020-05-18 ENCOUNTER — APPOINTMENT (OUTPATIENT)
Dept: NEUROLOGY | Facility: CLINIC | Age: 30
End: 2020-05-18
Payer: MEDICARE

## 2020-05-18 PROCEDURE — 99213 OFFICE O/P EST LOW 20 MIN: CPT | Mod: 95

## 2020-05-18 NOTE — DATA REVIEWED
[de-identified] : EEG monitoring showed One generalized tonic seizure recorded with poor lateralization 2/2017

## 2020-05-18 NOTE — PHYSICAL EXAM
[FreeTextEntry1] : Constitutional: alert and in no acute distress. \par Psychiatric:. at baseline. \par Neurologic: \par Orientation: oriented to person. \par Cranial Nerves: smell intact bilaterally, visual acuity poor, but can  make out face of mom.  pupils equal round and reactive to light, extraocular motion intact, facial sensation intact symmetrically, face symmetrical, hearing was intact bilaterally, tongue and palate midline, head turning and shoulder shrug symmetric and there was no tongue deviation with protrusion. \par Motor Strength:. there was weakness in both upper extremities. there was weakness in both lower extremities. increased tone, spasticity\par Sensory exam: deferred\par Coordination: Non-ambulatory. \par Deep tendon reflexes: deferred\par

## 2020-05-18 NOTE — DISCUSSION/SUMMARY
[Compliance with Medications] : The importance of compliance with medications was reinforced. [Safety Recommendations] : The patient was advised in regards to the risk of seizures and general seizure safety recommendations including not to be bathing alone, climbing to high places and operating heavy machinery. [Medication Side Effects] : High frequency and serious potential medication adverse effects were reviewed with the patient, including but not exclusive to psychiatric effects.  Information sheets on medication side effects were made available to the patient in our clinic.  The patient or advocate agrees to notify us for any concerns. [Risk of Death] : Risk of death associated with seizures / SUDEP was discussed. [Sleep Hygiene/Sleep Disruption Risks] : Sleep hygiene and the risks of sleep disruption were discussed. [FreeTextEntry1] : 29 year old man with history MR/CP w/ refractory epilepsy.\par EEG monitoring showed One generalized tonic seizure recorded with poor lateralization 2/2017. 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 There is minimal genu and splenium remnant of the corpus callosum.  s/p VNS implant 5/2017 with good results\par \par spasticity, \par \par Plan:\par - continue current AED regimen\par - reviewed seizure triggers\par - interrogate VNS ncv. \par - consider second opinion for botox with mov d/o for comfort/spasticity.\par - follow up in 6 months (call if any concerns)

## 2020-05-18 NOTE — PROCEDURE
[FreeTextEntry5] : 1.75 [FreeTextEntry6] : 30 [FreeTextEntry7] : 500 [FreeTextEntry8] : 30 [FreeTextEntry9] : 5.0 [de-identified] : 500 [de-identified] : 2.00 [de-identified] : 60 [de-identified] : 1.873 [de-identified] : 498 [de-identified] : 60 [de-identified] : 114 [de-identified] : 8 [de-identified] : 30->40% [FreeTextEntry4] : VNS Information: [employer] Model 220 : LOT number [id number]\par

## 2020-05-18 NOTE — HISTORY OF PRESENT ILLNESS
[FreeTextEntry1] : Telemedicine Visit Note:\par \par Patient consented to discussion of medical condition via remote telehealth from home [address] \[place] [address] .  Visit conducted in this manner due to the current pandemic by Doctor ZORAN ABARCA from remote location. Due to the coronavirus outbreak, we are attempting to mitigate exposure to vulnerable patients at risk for a face to face/elective visit.  We will plan to move the scheduled appointments for now towards the summer as feasible, or forward to the next scheduled interval if the patient is stable.  Medication supply and refills were addressed.  \par Discussed with patient current degree of clinical stability.  Patient/caregiver were given opportunity to discuss questions, which were answered. [phone] \par x min=15\par \par UPDATE: All therapy by phone.\par Patient accompanied by mother. He is doing very well with seizures, dec by 60%.\par (prev brief tonic seizures when stressed, fatigue, or excited), few per week\par His mother is very happy with the VNS, AED regimen\par No ADRs \par Due for bone density.\par \par Current AED: no recent Ca/Vit D. Carbamazepine 200mg q12h, Keppra 1000mg am/2000mg pm, Phenobarbital 97.2mg BID\par Trials:  Past treatment has included carbamazepine (Tegretol), divalproex (Depakote), levetiracetam (Keppra) and phenobarbital. \par \par PMHX: 28 yo M with history MR/CP presents for follow up of medically refractory generalized epilepsy. Pt is status post VNS implantation on 5/25/17.\par Since then he has had a "60% decrease" in seizure frequency/severity. Mother is happy with results\par GTC description with associated tongue biting.  mainly nocturnal lasting 20-40 seconds described as generalized shaking with mouth drooling. Frequency is twice a night at baseline. \par \par The patient started having seizures shortly after birth. His "midwife tried giving him pills" which led to hypoxic injury. \par

## 2020-06-09 ENCOUNTER — RX RENEWAL (OUTPATIENT)
Age: 30
End: 2020-06-09

## 2020-07-30 ENCOUNTER — RX RENEWAL (OUTPATIENT)
Age: 30
End: 2020-07-30

## 2020-07-31 ENCOUNTER — RX RENEWAL (OUTPATIENT)
Age: 30
End: 2020-07-31

## 2020-09-22 ENCOUNTER — APPOINTMENT (OUTPATIENT)
Dept: NEUROLOGY | Facility: CLINIC | Age: 30
End: 2020-09-22

## 2020-09-22 ENCOUNTER — RX RENEWAL (OUTPATIENT)
Age: 30
End: 2020-09-22

## 2020-10-21 ENCOUNTER — RX RENEWAL (OUTPATIENT)
Age: 30
End: 2020-10-21

## 2020-12-14 ENCOUNTER — APPOINTMENT (OUTPATIENT)
Dept: NEUROLOGY | Facility: CLINIC | Age: 30
End: 2020-12-14

## 2021-04-15 ENCOUNTER — RX RENEWAL (OUTPATIENT)
Age: 31
End: 2021-04-15

## 2021-04-20 ENCOUNTER — APPOINTMENT (OUTPATIENT)
Dept: NEUROLOGY | Facility: CLINIC | Age: 31
End: 2021-04-20

## 2021-05-25 ENCOUNTER — RX RENEWAL (OUTPATIENT)
Age: 31
End: 2021-05-25

## 2021-06-15 ENCOUNTER — APPOINTMENT (OUTPATIENT)
Dept: NEUROLOGY | Facility: CLINIC | Age: 31
End: 2021-06-15
Payer: MEDICARE

## 2021-06-15 PROCEDURE — 99213 OFFICE O/P EST LOW 20 MIN: CPT | Mod: 95

## 2021-08-20 ENCOUNTER — RX RENEWAL (OUTPATIENT)
Age: 31
End: 2021-08-20

## 2021-10-19 ENCOUNTER — RX RENEWAL (OUTPATIENT)
Age: 31
End: 2021-10-19

## 2021-11-22 ENCOUNTER — RX RENEWAL (OUTPATIENT)
Age: 31
End: 2021-11-22

## 2021-11-22 NOTE — DISCUSSION/SUMMARY
[Safety Recommendations] : The patient was advised in regards to the risk of seizures and general seizure safety recommendations including not to be bathing alone, climbing to high places and operating heavy machinery. [Compliance with Medications] : The importance of compliance with medications was reinforced. [Medication Side Effects] : High frequency and serious potential medication adverse effects were reviewed with the patient, including but not exclusive to psychiatric effects.  Information sheets on medication side effects were made available to the patient in our clinic.  The patient or advocate agrees to notify us for any concerns. [Sleep Hygiene/Sleep Disruption Risks] : Sleep hygiene and the risks of sleep disruption were discussed. [Risk of Death] : Risk of death associated with seizures / SUDEP was discussed. 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 There is minimal genu and splenium remnant of the corpus callosum.  s/p VNS implant 5/2017 with good results\par \par Spasticity \par Some self injurious behaviors.\par \par Plan:\par - lorazapem 1mg for anxiety/seizures PRN <1x/week if needed\par - continue current AED regimen\par - reviewed seizure triggers\par - interrogate VNS ncv. \par - consider second opinion for botox with mov d/o for comfort/spasticity.\par - follow up in 4 months (call if any concerns)\par \par xtime 21min

## 2021-11-22 NOTE — PHYSICAL EXAM
[FreeTextEntry1] : Constitutional: alert and in no acute distress. \par Psychiatric:. at baseline. \par Neurologic: \par Orientation: oriented to person. \par Cranial Nerves: smell intact bilaterally, visual acuity poor, but can  make out face of mom.  pupils equal round and reactive to light, extraocular motion intact, facial sensation intact symmetrically, face symmetrical, hearing was intact bilaterally, tongue and palate midline, head turning and shoulder shrug symmetric and there was no tongue deviation with protrusion. \par Motor Strength:. there was weakness in both upper extremities. there was weakness in both lower extremities. increased tone, spasticity\par Sensory exam: deferred\par Coordination: Non-ambulatory. \par Deep tendon reflexes: NT\par

## 2021-11-22 NOTE — PROCEDURE
[FreeTextEntry5] : 1.75 [FreeTextEntry6] : 30 [FreeTextEntry7] : 500 [FreeTextEntry8] : 30 [FreeTextEntry9] : 5.0 [de-identified] : 2.00 [de-identified] : 500 [de-identified] : 60 [de-identified] : 1.873 [de-identified] : 679 [de-identified] : 60 [de-identified] : 114 [de-identified] : 8 [de-identified] : 30->40% [FreeTextEntry4] : VNS Information: [employer] Model 220 : LOT number [id number]\par

## 2021-11-22 NOTE — CONSULT LETTER
[Dear  ___] : Dear  [unfilled], [Consult Letter:] : I had the pleasure of evaluating your patient, [unfilled]. [( Thank you for referring [unfilled] for consultation for _____ )] : Thank you for referring [unfilled] for consultation for [unfilled] [Please see my note below.] : Please see my note below. [Consult Closing:] : Thank you very much for allowing me to participate in the care of this patient.  If you have any questions, please do not hesitate to contact me. [Sincerely,] : Sincerely, [FreeTextEntry2] : Yasmine Su MD [FreeTextEntry3] : Sammy Keith MD, MALATHI\par Board Certified: Neurology, Clinical Neurophysiology, Epilepsy\par , Department of Neurology\par Epilepsy Fellowship \par [facility]\par \par

## 2021-11-22 NOTE — DATA REVIEWED
[de-identified] : EEG monitoring showed One generalized tonic seizure recorded with poor lateralization 2/2017

## 2021-11-22 NOTE — HISTORY OF PRESENT ILLNESS
[FreeTextEntry1] : Telemedicine Visit Note:\par \par Patient consented to discussion of medical condition via remote telehealth from home [address]\[place] [address] .  Visit conducted in this manner due to the current pandemic by Doctor ZORAN ABARCA from remote location. Due to the coronavirus outbreak, we are attempting to mitigate exposure to vulnerable patients at risk for a face to face/elective visit.  We will plan to move the scheduled appointments for now towards the summer as feasible, or forward to the next scheduled interval if the patient is stable.  Medication supply and refills were addressed.  \par Discussed with patient current degree of clinical stability.  Patient/caregiver were given opportunity to discuss questions, which were answered. [phone] \par x min=21\par \par Current AED: no recent Ca/Vit D. Carbamazepine 200mg q12h, Keppra 1000mg am/2000mg pm, Phenobarbital 97.2mg BID\par Trials:  Past treatment has included carbamazepine (Tegretol), divalproex (Depakote), levetiracetam (Keppra) and phenobarbital. \par \par UPDATE:  COVID19 resolved +abs.\par Patient accompanied by mother. He is doing very well with seizures, dec by 60%.\par (prev brief tonic seizures when stressed, fatigue, or excited), few per week\par His mother is very happy with the VNS, AED regimen\par No ADRs \par Due for bone density.\par Some tantrum/behavioral changes last month, mom requesting sedative.\par \par PMHX: 31 yo M with history MR/CP presents for follow up of medically refractory generalized epilepsy. Pt is status post VNS implantation on 5/25/17.\par Since then he has had a "60% decrease" in seizure frequency/severity. Mother is happy with results\par GTC description with associated tongue biting.  mainly nocturnal lasting 20-40 seconds described as generalized shaking with mouth drooling. Frequency is twice a night at baseline. \par \par The patient started having seizures shortly after birth. His "midwife tried giving him pills" which led to hypoxic injury. \par

## 2021-11-23 ENCOUNTER — RX RENEWAL (OUTPATIENT)
Age: 31
End: 2021-11-23

## 2022-01-01 NOTE — ASU PATIENT PROFILE, ADULT - REASON FOR ADMISSION, PROFILE
Vaccine Information Statement(s) or the Emergency Use Authorization was given today. This has been reviewed, questions answered, and verbal consent given by Parent for injection(s) and administration of Pediarix, Pneumococcal Conjugate (PCV13) and Rotavirus.    Patient tolerated without incident. See immunization grid for documentation.     dental surgery

## 2022-02-24 ENCOUNTER — RX RENEWAL (OUTPATIENT)
Age: 32
End: 2022-02-24

## 2022-04-15 ENCOUNTER — APPOINTMENT (OUTPATIENT)
Dept: NEUROLOGY | Facility: CLINIC | Age: 32
End: 2022-04-15
Payer: MEDICARE

## 2022-04-15 VITALS
SYSTOLIC BLOOD PRESSURE: 98 MMHG | DIASTOLIC BLOOD PRESSURE: 65 MMHG | BODY MASS INDEX: 26.52 KG/M2 | HEIGHT: 66 IN | WEIGHT: 165 LBS | HEART RATE: 76 BPM

## 2022-04-15 PROCEDURE — 99213 OFFICE O/P EST LOW 20 MIN: CPT

## 2022-04-15 PROCEDURE — 95970 ALYS NPGT W/O PRGRMG: CPT

## 2022-04-18 NOTE — REASON FOR VISIT
[place], calling to talk with  about pt's blood pressure of 210/96. Per Eve Mahajan take the message.   Dr will call her back ASAP     Also the PT/INR  Is 2.4  Pt is taking 7.5 coumadin mar [Follow-Up: _____] : a [unfilled] follow-up visit [Parent] : parent

## 2022-04-18 NOTE — DISCUSSION/SUMMARY
[Safety Recommendations] : The patient was advised in regards to the risk of seizures and general seizure safety recommendations including not to be bathing alone, climbing to high places and operating heavy machinery. [Compliance with Medications] : The importance of compliance with medications was reinforced. [Medication Side Effects] : High frequency and serious potential medication adverse effects were reviewed with the patient, including but not exclusive to psychiatric effects.  Information sheets on medication side effects were made available to the patient in our clinic.  The patient or advocate agrees to notify us for any concerns. [Sleep Hygiene/Sleep Disruption Risks] : Sleep hygiene and the risks of sleep disruption were discussed. [Risk of Death] : Risk of death associated with seizures / SUDEP was discussed. 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 There is minimal genu and splenium remnant of the corpus callosum.  s/p VNS implant 5/2017 with good results\par \par Spasticity \par Some self injurious behaviors.\par \par Plan:\par - continue current AED regimen\par - reviewed seizure triggers\par -annual labwork\par - interrogate VNS  battery in one month and will schedule appt with Qiana Zavala for VNS battery change\par \par \par xtime 21min

## 2022-04-18 NOTE — DATA REVIEWED
[de-identified] : EEG monitoring showed One generalized tonic seizure recorded with poor lateralization 2/2017

## 2022-04-18 NOTE — HISTORY OF PRESENT ILLNESS
[FreeTextEntry1] : ***UPDATE:4/15/2022***\par Mr Niranjan Savage is here today for a scheduled follow up office visit and ia accompanied by his mother\par He is doing well with minimal seizures. Mother reports when he is stressed or upset he may have a brief tonic seizure\par VNS battery at 11-25%\par \par \par Keppra 1000/2000\par Carbamazepine 200 mg BID\par \par Telemedicine Visit Note:\par \par Patient consented to discussion of medical condition via remote telehealth [address]\[place] [address] .  Visit conducted in this manner due to the current pandemic by Doctor ZORAN ABARCA from remote location. Due to the coronavirus outbreak, we are attempting to mitigate exposure to vulnerable patients at risk for a face to face/elective visit.  We will plan to move the scheduled appointments for now towards the summer as feasible, or forward to the next scheduled interval if the patient is stable.  Medication supply and refills were addressed.  \par Discussed with patient current degree of clinical stability.  Patient/caregiver were given opportunity to discuss questions, which were answered. [phone] \par x min=21\par \par Current AED: no recent Ca/Vit D. Carbamazepine 200mg q12h, Keppra 1000mg am/2000mg pm, Phenobarbital 97.2mg BID\par Trials:  Past treatment has included carbamazepine (Tegretol), divalproex (Depakote), levetiracetam (Keppra) and phenobarbital. \par \par UPDATE:  COVID19 resolved +abs.\par Patient accompanied by mother. He is doing very well with seizures, dec by 60%.\par (prev brief tonic seizures when stressed, fatigue, or excited), few per week\par His mother is very happy with the VNS, AED regimen\par No ADRs \par Due for bone density.\par Some tantrum/behavioral changes last month, mom requesting sedative.\par \par PMHX: 31 yo M with history MR/CP presents for follow up of medically refractory generalized epilepsy. Pt is status post VNS implantation on 5/25/17.\par Since then he has had a "60% decrease" in seizure frequency/severity. Mother is happy with results\par GTC description with associated tongue biting.  mainly nocturnal lasting 20-40 seconds described as generalized shaking with mouth drooling. Frequency is twice a night at baseline. \par \par The patient started having seizures shortly after birth. His "midwife tried giving him pills" which led to hypoxic injury. \par

## 2022-04-18 NOTE — PROCEDURE
[FreeTextEntry5] : 1.75 [FreeTextEntry6] : 30 [FreeTextEntry7] : 500 [FreeTextEntry8] : 30 [FreeTextEntry9] : 5.0 [de-identified] : 2.00 [de-identified] : 60 [de-identified] : 500 [de-identified] : 1.876 [de-identified] : 627 [de-identified] : 60 [de-identified] : 114 [de-identified] : 8 [de-identified] : 30->40% [FreeTextEntry4] : VNS Information: [employer] Model 220 : LOT number [id number]\par

## 2022-04-19 ENCOUNTER — RX RENEWAL (OUTPATIENT)
Age: 32
End: 2022-04-19

## 2022-05-17 ENCOUNTER — APPOINTMENT (OUTPATIENT)
Dept: NEUROLOGY | Facility: CLINIC | Age: 32
End: 2022-05-17
Payer: MEDICARE

## 2022-05-17 VITALS
WEIGHT: 165 LBS | BODY MASS INDEX: 26.52 KG/M2 | SYSTOLIC BLOOD PRESSURE: 118 MMHG | HEART RATE: 70 BPM | HEIGHT: 66 IN | DIASTOLIC BLOOD PRESSURE: 66 MMHG

## 2022-05-17 PROCEDURE — 99213 OFFICE O/P EST LOW 20 MIN: CPT

## 2022-05-17 PROCEDURE — 95976 ALYS SMPL CN NPGT PRGRMG: CPT

## 2022-05-18 NOTE — DATA REVIEWED
[de-identified] : EEG monitoring showed One generalized tonic seizure recorded with poor lateralization 2/2017

## 2022-05-18 NOTE — HISTORY OF PRESENT ILLNESS
[FreeTextEntry1] : ***UPDATE:5/17/2022**\par Mr Elizabeth Savage is here today for a scheduled follow up office visit and is accompanied by hismother\par He is doing well with infrequent brief seizures\par VNS battery 11-25%\par \par \par ***UPDATE:4/15/2022***\par Mr Niranjan Savage is here today for a scheduled follow up office visit and ia accompanied by his mother\par He is doing well with minimal seizures. Mother reports when he is stressed or upset he may have a brief tonic seizure\par VNS battery at 11-25%\par \par \par Keppra 1000/2000\par Carbamazepine 200 mg BID\par \par Telemedicine Visit Note:\par \par Patient consented to discussion of medical condition via remote telehealth from home [address] [place] [address] \[place] [address] .  Visit conducted in this manner due to the current pandemic by Doctor ZORAN ABARCA from remote location. Due to the coronavirus outbreak, we are attempting to mitigate exposure to vulnerable patients at risk for a face to face/elective visit.  We will plan to move the scheduled appointments for now towards the summer as feasible, or forward to the next scheduled interval if the patient is stable.  Medication supply and refills were addressed.  \par Discussed with patient current degree of clinical stability.  Patient/caregiver were given opportunity to discuss questions, which were answered. [phone] \par x min=21\par \par Current AED: no recent Ca/Vit D. Carbamazepine 200mg q12h, Keppra 1000mg am/2000mg pm, Phenobarbital 97.2mg BID\par Trials:  Past treatment has included carbamazepine (Tegretol), divalproex (Depakote), levetiracetam (Keppra) and phenobarbital. \par \par UPDATE:  COVID19 resolved +abs.\par Patient accompanied by mother. He is doing very well with seizures, dec by 60%.\par (prev brief tonic seizures when stressed, fatigue, or excited), few per week\par His mother is very happy with the VNS, AED regimen\par No ADRs \par Due for bone density.\par Some tantrum/behavioral changes last month, mom requesting sedative.\par \par PMHX: 31 yo M with history MR/CP presents for follow up of medically refractory generalized epilepsy. Pt is status post VNS implantation on 5/25/17.\par Since then he has had a "60% decrease" in seizure frequency/severity. Mother is happy with results\par GTC description with associated tongue biting.  mainly nocturnal lasting 20-40 seconds described as generalized shaking with mouth drooling. Frequency is twice a night at baseline. \par \par The patient started having seizures shortly after birth. His "midwife tried giving him pills" which led to hypoxic injury. \par

## 2022-05-18 NOTE — DISCUSSION/SUMMARY
[Safety Recommendations] : The patient was advised in regards to the risk of seizures and general seizure safety recommendations including not to be bathing alone, climbing to high places and operating heavy machinery. [Compliance with Medications] : The importance of compliance with medications was reinforced. [Medication Side Effects] : High frequency and serious potential medication adverse effects were reviewed with the patient, including but not exclusive to psychiatric effects.  Information sheets on medication side effects were made available to the patient in our clinic.  The patient or advocate agrees to notify us for any concerns. [Sleep Hygiene/Sleep Disruption Risks] : Sleep hygiene and the risks of sleep disruption were discussed. [Risk of Death] : Risk of death associated with seizures / SUDEP was discussed. 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 There is minimal genu and splenium remnant of the corpus callosum.  s/p VNS implant 5/2017 with good results\par \par Spasticity \par Some self injurious behaviors.\par \par Plan:\par - continue current AED regimen\par - reviewed seizure triggers\par -annual labwork\par - schedule appt with Qinaa Zavala for VNS battery change\par \par \par xtime 21min

## 2022-05-18 NOTE — PROCEDURE
[FreeTextEntry5] : 1.75 [FreeTextEntry6] : 30 [FreeTextEntry7] : 500 [FreeTextEntry8] : 30 [FreeTextEntry9] : 5.0 [de-identified] : 2.00 [de-identified] : 500 [de-identified] : 60 [de-identified] : 1.877 [de-identified] : 293 [de-identified] : 60 [de-identified] : 114 [de-identified] : 8 [de-identified] : 30->40% [FreeTextEntry4] : VNS Information: [employer] Model 220 : LOT number [id number]\par

## 2022-06-07 ENCOUNTER — APPOINTMENT (OUTPATIENT)
Dept: NEUROSURGERY | Facility: CLINIC | Age: 32
End: 2022-06-07
Payer: MEDICARE

## 2022-06-07 VITALS
DIASTOLIC BLOOD PRESSURE: 73 MMHG | WEIGHT: 140 LBS | SYSTOLIC BLOOD PRESSURE: 111 MMHG | TEMPERATURE: 97.7 F | OXYGEN SATURATION: 100 % | HEART RATE: 69 BPM | BODY MASS INDEX: 20.73 KG/M2 | HEIGHT: 69 IN

## 2022-06-07 PROCEDURE — 95976 ALYS SMPL CN NPGT PRGRMG: CPT

## 2022-06-07 PROCEDURE — 99204 OFFICE O/P NEW MOD 45 MIN: CPT | Mod: 25

## 2022-06-14 NOTE — HISTORY OF PRESENT ILLNESS
[FreeTextEntry1] :  The patient's birth history was remarkable for aspiration and hypoxia. He had  ischemic brain injury.  Seizures began 2 weeks after this episode. He has daily episodes where he has increased tone, tongue biting, eye rolling and hypersalivation. Events are primarily nocturnal and refractory to multiple medications. His family believes that increased agitation and startles can be a trigger. Recent EEG monitoring shows tonic bilateral seizure onsets. MRi shows bilateral ENEIDA and watershed infarcts. There is minimal terrence and splenium remnant of the corpus callosum.  \par \par He underwent VNS implantation on 17 and tolerated the procedure well. He has been doing well with infrequent brief seizures. He is currently taking Keppra and Carbamazepine. He has been following up with Val Zhang NP and on his last visit, the VNS was noted to be at a low battery life.

## 2022-06-14 NOTE — END OF VISIT
"                        Chip Hartley   1/4/2017 1:45 PM   Office Visit    Dept Phone:  [phone]   Encounter #:  [id number]    Provider:  Jeff Restrepo MD   Department:  [facility] GENERAL SURGERY                Your Full Care Plan              Your Updated Medication List          This list is accurate as of: 1/4/17  2:27 PM.  Always use your most recent med list.                aspirin tablet       FLOMAX 0.4 MG capsule 24 hr capsule   Generic drug:  tamsulosin       lisinopril-hydrochlorothiazide 10-12.5 MG per tablet   Commonly known as:  PRINZIDE,ZESTORETIC       metFORMIN  MG 24 hr tablet   Commonly known as:  GLUCOPHAGE-XR       pravastatin 40 MG tablet   Commonly known as:  PRAVACHOL               Instructions     None    Patient Instructions History      Upcoming Appointments     Visit Type Date Time Department    OFFICE VISIT 1/4/2017  1:45 PM [facility]      MyChart Signup     Our records indicate that your [facility] [employer] account has been deactivated. If you would like to reactivate your account, please email [email or web address] or call [phone] to talk to our [employer] staff.             Other Info from Your Visit           Allergies     No Known Allergies      Reason for Visit     Follow-up           Vital Signs     Blood Pressure Height Weight Body Mass Index Smoking Status       132/82 66\" (167.6 cm) 200 lb (90.7 kg) 32.28 kg/m2 Former Smoker         " [Time Spent: ___ minutes] : I have spent [unfilled] minutes of time on the encounter.

## 2022-06-14 NOTE — PROCEDURE
[106] : 106 [25%] : 25% [FreeTextEntry1] : 09520 [FreeTextEntry5] : 1.75 [FreeTextEntry6] : 30 [FreeTextEntry7] : 500 [FreeTextEntry8] : 30 [FreeTextEntry9] : 5.0 [de-identified] : 2.00 [de-identified] : 500 [de-identified] : 60 [de-identified] : 1.870 [de-identified] : 164 [de-identified] : 60 [de-identified] : 114 [de-identified] : 8 [de-identified] : 30->40% [FreeTextEntry4] : \par

## 2022-06-14 NOTE — ASSESSMENT
[FreeTextEntry1] : 31 year old male with severe neurocognitive delay and frequent partial seizures with multifocal onsets, intractable to multiple medication trials. He underwent VNS implantation with good response. Now VNS noted to be at low battery life\par \par Plan:\par - VNS battery change \par risks,.benefits and alternatives discussed\par \par

## 2022-06-14 NOTE — PHYSICAL EXAM
[General Appearance - Alert] : alert [General Appearance - In No Acute Distress] : in no acute distress [Non-ambulatory] : Non-ambulatory [Sclera] : the sclera and conjunctiva were normal [Outer Ear] : the ears and nose were normal in appearance [] : no respiratory distress [Exaggerated Use Of Accessory Muscles For Inspiration] : no accessory muscle use [Heart Rate And Rhythm] : heart rate was normal and rhythm regular [Edema] : there was no peripheral edema [Abdomen Tenderness] : non-tender [No Spinal Tenderness] : no spinal tenderness [Skin Color & Pigmentation] : normal skin color and pigmentation [Skin Turgor] : normal skin turgor [FreeTextEntry1] : awake and responsive [Tremor] : no tremor present [FreeTextEntry6] : (+) contracture in arms

## 2022-06-16 ENCOUNTER — OUTPATIENT (OUTPATIENT)
Dept: OUTPATIENT SERVICES | Facility: HOSPITAL | Age: 32
LOS: 1 days | End: 2022-06-16
Payer: MEDICARE

## 2022-06-16 VITALS
SYSTOLIC BLOOD PRESSURE: 96 MMHG | HEIGHT: 66 IN | RESPIRATION RATE: 18 BRPM | WEIGHT: 139.99 LBS | OXYGEN SATURATION: 100 % | HEART RATE: 76 BPM | DIASTOLIC BLOOD PRESSURE: 64 MMHG

## 2022-06-16 DIAGNOSIS — Z98.890 OTHER SPECIFIED POSTPROCEDURAL STATES: Chronic | ICD-10-CM

## 2022-06-16 DIAGNOSIS — Z01.818 ENCOUNTER FOR OTHER PREPROCEDURAL EXAMINATION: ICD-10-CM

## 2022-06-16 DIAGNOSIS — G40.909 EPILEPSY, UNSPECIFIED, NOT INTRACTABLE, WITHOUT STATUS EPILEPTICUS: ICD-10-CM

## 2022-06-16 DIAGNOSIS — Z96.89 PRESENCE OF OTHER SPECIFIED FUNCTIONAL IMPLANTS: Chronic | ICD-10-CM

## 2022-06-16 DIAGNOSIS — Z98.811 DENTAL RESTORATION STATUS: Chronic | ICD-10-CM

## 2022-06-16 LAB
ANION GAP SERPL CALC-SCNC: 11 MMOL/L — SIGNIFICANT CHANGE UP (ref 5–17)
BLD GP AB SCN SERPL QL: NEGATIVE — SIGNIFICANT CHANGE UP
BUN SERPL-MCNC: 10 MG/DL — SIGNIFICANT CHANGE UP (ref 7–23)
CALCIUM SERPL-MCNC: 9.3 MG/DL — SIGNIFICANT CHANGE UP (ref 8.4–10.5)
CHLORIDE SERPL-SCNC: 100 MMOL/L — SIGNIFICANT CHANGE UP (ref 96–108)
CO2 SERPL-SCNC: 24 MMOL/L — SIGNIFICANT CHANGE UP (ref 22–31)
CREAT SERPL-MCNC: 0.63 MG/DL — SIGNIFICANT CHANGE UP (ref 0.5–1.3)
EGFR: 130 ML/MIN/1.73M2 — SIGNIFICANT CHANGE UP
GLUCOSE SERPL-MCNC: 86 MG/DL — SIGNIFICANT CHANGE UP (ref 70–99)
HCT VFR BLD CALC: 41.2 % — SIGNIFICANT CHANGE UP (ref 39–50)
HGB BLD-MCNC: 13.2 G/DL — SIGNIFICANT CHANGE UP (ref 13–17)
MCHC RBC-ENTMCNC: 29.1 PG — SIGNIFICANT CHANGE UP (ref 27–34)
MCHC RBC-ENTMCNC: 32 GM/DL — SIGNIFICANT CHANGE UP (ref 32–36)
MCV RBC AUTO: 90.7 FL — SIGNIFICANT CHANGE UP (ref 80–100)
MRSA PCR RESULT.: SIGNIFICANT CHANGE UP
NRBC # BLD: 0 /100 WBCS — SIGNIFICANT CHANGE UP (ref 0–0)
PLATELET # BLD AUTO: 288 K/UL — SIGNIFICANT CHANGE UP (ref 150–400)
POTASSIUM SERPL-MCNC: 3.7 MMOL/L — SIGNIFICANT CHANGE UP (ref 3.5–5.3)
POTASSIUM SERPL-SCNC: 3.7 MMOL/L — SIGNIFICANT CHANGE UP (ref 3.5–5.3)
RBC # BLD: 4.54 M/UL — SIGNIFICANT CHANGE UP (ref 4.2–5.8)
RBC # FLD: 12.6 % — SIGNIFICANT CHANGE UP (ref 10.3–14.5)
RH IG SCN BLD-IMP: POSITIVE — SIGNIFICANT CHANGE UP
S AUREUS DNA NOSE QL NAA+PROBE: SIGNIFICANT CHANGE UP
SODIUM SERPL-SCNC: 135 MMOL/L — SIGNIFICANT CHANGE UP (ref 135–145)
WBC # BLD: 6.15 K/UL — SIGNIFICANT CHANGE UP (ref 3.8–10.5)
WBC # FLD AUTO: 6.15 K/UL — SIGNIFICANT CHANGE UP (ref 3.8–10.5)

## 2022-06-16 PROCEDURE — 85027 COMPLETE CBC AUTOMATED: CPT

## 2022-06-16 PROCEDURE — 87640 STAPH A DNA AMP PROBE: CPT

## 2022-06-16 PROCEDURE — 80048 BASIC METABOLIC PNL TOTAL CA: CPT

## 2022-06-16 PROCEDURE — 87641 MR-STAPH DNA AMP PROBE: CPT

## 2022-06-16 PROCEDURE — 86901 BLOOD TYPING SEROLOGIC RH(D): CPT

## 2022-06-16 PROCEDURE — 86850 RBC ANTIBODY SCREEN: CPT

## 2022-06-16 PROCEDURE — 36415 COLL VENOUS BLD VENIPUNCTURE: CPT

## 2022-06-16 PROCEDURE — 86900 BLOOD TYPING SEROLOGIC ABO: CPT

## 2022-06-16 PROCEDURE — G0463: CPT

## 2022-06-16 NOTE — H&P PST ADULT - FALL HARM RISK - RISK INTERVENTIONS

## 2022-06-16 NOTE — H&P PST ADULT - HISTORY OF PRESENT ILLNESS
This is a 30 y/o [place] speaking male, understands some English PMH cerebral palsy, patient is wheelchair bound; however, able to lift bilateral lower extremities off foot rests slightly and has weak bilateral hand , able to answer simple yes/no questions and follow simple commands, epilepsy, S/P vagus nerve stimulator 2017, now having daily seizures.  Presents today for vagus nerve stimulator revision.    COVID+ 3/2021, asymptomatic, COVID swab scheduled 6/27/22 at [facility] This is a 30 y/o Japanese speaking male, understands some English PMH cerebral palsy, patient is wheelchair bound; however, able to lift bilateral lower extremities off foot rests slightly and has weak bilateral hand , able to answer simple yes/no questions and follow simple commands.  The patient has H/O epilepsy, S/P vagus nerve stimulator 2017, now having daily seizures.  Presents today for vagus nerve stimulator revision.    COVID+ 3/2021, asymptomatic, COVID swab scheduled 6/27/22 at [facility]

## 2022-06-16 NOTE — H&P PST ADULT - NSICDXPASTMEDICALHX_GEN_ALL_CORE_FT
PAST MEDICAL HISTORY:  CP (cerebral palsy)     Dental caries     Epilepsy GTCs, partial seizures    MR (mental retardation)

## 2022-06-16 NOTE — H&P PST ADULT - NSICDXPASTSURGICALHX_GEN_ALL_CORE_FT
PAST SURGICAL HISTORY:  H/O knee surgery b/l, for contractures - 2009    S/P dental restoration     S/P placement of VNS (vagus nerve stimulation) device 2017

## 2022-06-20 ENCOUNTER — RX RENEWAL (OUTPATIENT)
Age: 32
End: 2022-06-20

## 2022-06-27 ENCOUNTER — OUTPATIENT (OUTPATIENT)
Dept: OUTPATIENT SERVICES | Facility: HOSPITAL | Age: 32
LOS: 1 days | End: 2022-06-27
Payer: MEDICARE

## 2022-06-27 DIAGNOSIS — Z11.52 ENCOUNTER FOR SCREENING FOR COVID-19: ICD-10-CM

## 2022-06-27 DIAGNOSIS — Z96.89 PRESENCE OF OTHER SPECIFIED FUNCTIONAL IMPLANTS: Chronic | ICD-10-CM

## 2022-06-27 DIAGNOSIS — Z98.811 DENTAL RESTORATION STATUS: Chronic | ICD-10-CM

## 2022-06-27 DIAGNOSIS — Z98.890 OTHER SPECIFIED POSTPROCEDURAL STATES: Chronic | ICD-10-CM

## 2022-06-27 LAB — SARS-COV-2 RNA SPEC QL NAA+PROBE: SIGNIFICANT CHANGE UP

## 2022-06-27 PROCEDURE — U0003: CPT

## 2022-06-27 PROCEDURE — C9803: CPT

## 2022-06-27 PROCEDURE — U0005: CPT

## 2022-06-29 ENCOUNTER — TRANSCRIPTION ENCOUNTER (OUTPATIENT)
Age: 32
End: 2022-06-29

## 2022-06-30 ENCOUNTER — OUTPATIENT (OUTPATIENT)
Dept: OUTPATIENT SERVICES | Facility: HOSPITAL | Age: 32
LOS: 1 days | End: 2022-06-30
Payer: MEDICARE

## 2022-06-30 ENCOUNTER — APPOINTMENT (OUTPATIENT)
Dept: NEUROSURGERY | Facility: HOSPITAL | Age: 32
End: 2022-06-30

## 2022-06-30 ENCOUNTER — TRANSCRIPTION ENCOUNTER (OUTPATIENT)
Age: 32
End: 2022-06-30

## 2022-06-30 VITALS
SYSTOLIC BLOOD PRESSURE: 110 MMHG | OXYGEN SATURATION: 100 % | WEIGHT: 139.99 LBS | TEMPERATURE: 98 F | HEIGHT: 66 IN | HEART RATE: 75 BPM | DIASTOLIC BLOOD PRESSURE: 66 MMHG | RESPIRATION RATE: 18 BRPM

## 2022-06-30 VITALS
DIASTOLIC BLOOD PRESSURE: 60 MMHG | TEMPERATURE: 97 F | RESPIRATION RATE: 16 BRPM | HEART RATE: 88 BPM | SYSTOLIC BLOOD PRESSURE: 105 MMHG | OXYGEN SATURATION: 98 %

## 2022-06-30 DIAGNOSIS — Z96.89 PRESENCE OF OTHER SPECIFIED FUNCTIONAL IMPLANTS: Chronic | ICD-10-CM

## 2022-06-30 DIAGNOSIS — G40.909 EPILEPSY, UNSPECIFIED, NOT INTRACTABLE, WITHOUT STATUS EPILEPTICUS: ICD-10-CM

## 2022-06-30 DIAGNOSIS — Z98.811 DENTAL RESTORATION STATUS: Chronic | ICD-10-CM

## 2022-06-30 DIAGNOSIS — Z98.890 OTHER SPECIFIED POSTPROCEDURAL STATES: Chronic | ICD-10-CM

## 2022-06-30 LAB — RH IG SCN BLD-IMP: POSITIVE — SIGNIFICANT CHANGE UP

## 2022-06-30 PROCEDURE — C1767: CPT

## 2022-06-30 PROCEDURE — 95976 ALYS SMPL CN NPGT PRGRMG: CPT

## 2022-06-30 PROCEDURE — 61885 INSRT/REDO NEUROSTIM 1 ARRAY: CPT

## 2022-06-30 DEVICE — STIM SENTIVA VAGUS NERVE: Type: IMPLANTABLE DEVICE | Status: FUNCTIONAL

## 2022-06-30 RX ORDER — LIDOCAINE HCL 20 MG/ML
0.2 VIAL (ML) INJECTION ONCE
Refills: 0 | Status: DISCONTINUED | OUTPATIENT
Start: 2022-06-30 | End: 2022-06-30

## 2022-06-30 RX ORDER — CARBAMAZEPINE 200 MG
1 TABLET ORAL
Qty: 0 | Refills: 0 | DISCHARGE

## 2022-06-30 RX ORDER — LEVETIRACETAM 250 MG/1
2 TABLET, FILM COATED ORAL
Qty: 0 | Refills: 0 | DISCHARGE

## 2022-06-30 RX ORDER — CHLORHEXIDINE GLUCONATE 213 G/1000ML
1 SOLUTION TOPICAL ONCE
Refills: 0 | Status: DISCONTINUED | OUTPATIENT
Start: 2022-06-30 | End: 2022-06-30

## 2022-06-30 RX ORDER — LEVETIRACETAM 250 MG/1
1 TABLET, FILM COATED ORAL
Qty: 0 | Refills: 0 | DISCHARGE

## 2022-06-30 RX ORDER — CEPHALEXIN 500 MG
1 CAPSULE ORAL
Qty: 4 | Refills: 0
Start: 2022-06-30 | End: 2022-07-01

## 2022-06-30 RX ORDER — SODIUM CHLORIDE 9 MG/ML
3 INJECTION INTRAMUSCULAR; INTRAVENOUS; SUBCUTANEOUS EVERY 8 HOURS
Refills: 0 | Status: DISCONTINUED | OUTPATIENT
Start: 2022-06-30 | End: 2022-06-30

## 2022-06-30 RX ORDER — HYDROMORPHONE HYDROCHLORIDE 2 MG/ML
0.25 INJECTION INTRAMUSCULAR; INTRAVENOUS; SUBCUTANEOUS
Refills: 0 | Status: DISCONTINUED | OUTPATIENT
Start: 2022-06-30 | End: 2022-06-30

## 2022-06-30 RX ORDER — OXYCODONE HYDROCHLORIDE 5 MG/1
1 TABLET ORAL
Qty: 4 | Refills: 0
Start: 2022-06-30 | End: 2022-06-30

## 2022-06-30 RX ORDER — PHENOBARBITAL 60 MG
1 TABLET ORAL
Qty: 0 | Refills: 0 | DISCHARGE

## 2022-06-30 RX ORDER — ONDANSETRON 8 MG/1
4 TABLET, FILM COATED ORAL ONCE
Refills: 0 | Status: DISCONTINUED | OUTPATIENT
Start: 2022-06-30 | End: 2022-06-30

## 2022-06-30 RX ORDER — CEFAZOLIN SODIUM 1 G
2000 VIAL (EA) INJECTION ONCE
Refills: 0 | Status: COMPLETED | OUTPATIENT
Start: 2022-06-30 | End: 2022-06-30

## 2022-06-30 NOTE — DISCHARGE NOTE NURSING/CASE MANAGEMENT/SOCIAL WORK - PATIENT PORTAL LINK FT
You can access the FollowMyHealth Patient Portal offered by [facility] by registering at the following website: [email or web address]. By joining [email or web address]’s FollowMyHealth portal, you will also be able to view your health information using other applications (apps) compatible with our system.

## 2022-06-30 NOTE — ASU DISCHARGE PLAN (ADULT/PEDIATRIC) - CARE PROVIDER_API CALL
Qiana Zavala)  Neurosurgery  [address], [address]  [place] [address]  Phone: [phone]  Fax: [phone]  Established Patient  Follow Up Time: 1 week

## 2022-06-30 NOTE — ASU PATIENT PROFILE, ADULT - FALL HARM RISK - RISK INTERVENTIONS

## 2022-06-30 NOTE — ASU DISCHARGE PLAN (ADULT/PEDIATRIC) - ASU DC SPECIAL INSTRUCTIONSFT
Do not bath for 1 days. After 1 days can shower. Do not scrub incision, allow water to wash over the incision. Do not submerge in water for 14 days. Do not remove dermabond it will fall off. Dressing can be removed POD 5. Call the office with any redness, burning, drainage, wound opening, worsening pain, redness, fevers. Take OTC tylenol as needed for pain as well as prescribed medications. Take keflex for 2 days as prescribed. Follow-up in the office as scheduled.

## 2022-06-30 NOTE — ASU DISCHARGE PLAN (ADULT/PEDIATRIC) - NS MD DC FALL RISK RISK
For information on Fall & Injury Prevention, visit: [email or web address] OR  [email or web address] OR  https://www.cdc.gov/steadi/patient.html

## 2022-07-08 ENCOUNTER — APPOINTMENT (OUTPATIENT)
Dept: NEUROSURGERY | Facility: CLINIC | Age: 32
End: 2022-07-08

## 2022-07-08 VITALS
TEMPERATURE: 98.4 F | BODY MASS INDEX: 20.73 KG/M2 | SYSTOLIC BLOOD PRESSURE: 100 MMHG | OXYGEN SATURATION: 98 % | HEART RATE: 72 BPM | HEIGHT: 69 IN | DIASTOLIC BLOOD PRESSURE: 62 MMHG | WEIGHT: 140 LBS

## 2022-07-08 PROCEDURE — 99024 POSTOP FOLLOW-UP VISIT: CPT

## 2022-07-08 PROCEDURE — 95970 ALYS NPGT W/O PRGRMG: CPT

## 2022-07-15 NOTE — HISTORY OF PRESENT ILLNESS
[FreeTextEntry1] :  The patient's birth history was remarkable for aspiration and hypoxia. He had  ischemic brain injury.  Seizures began 2 weeks after this episode. He has daily episodes where he has increased tone, tongue biting, eye rolling and hypersalivation. Events are primarily nocturnal and refractory to multiple medications. His family believes that increased agitation and startles can be a trigger. Recent EEG monitoring shows tonic bilateral seizure onsets. MRi shows bilateral ENEIDA and watershed infarcts. There is minimal terrence and splenium remnant of the corpus callosum.  \par \par He underwent VNS implantation on 17 and tolerated the procedure well. He has been doing well with infrequent brief seizures. He is currently taking Keppra and Carbamazepine. He has been following up with Val Zhang NP and on his last visit, the VNS was noted to be at a low battery life. \par \par He then underwent VNS revision on 22 and tolerated the procedure well. He is here today for wound check and for VNS interrogation.

## 2022-07-15 NOTE — PHYSICAL EXAM
[General Appearance - Alert] : alert [General Appearance - In No Acute Distress] : in no acute distress [Clean] : clean [Dry] : dry [Healing Well] : healing well [Intact] : intact [No Drainage] : without drainage [Normal Skin] : normal [Non-ambulatory] : Non-ambulatory [No Visual Abnormalities] : no visible abnormalities [Neck Appearance] : the appearance of the neck was normal [Neck Cervical Mass (___cm)] : no neck mass was observed [] : no respiratory distress [Exaggerated Use Of Accessory Muscles For Inspiration] : no accessory muscle use [Skin Color & Pigmentation] : normal skin color and pigmentation [FreeTextEntry1] : awake and reponsive; able to speak in words

## 2022-07-15 NOTE — PROCEDURE
[FreeTextEntry1] :  # [id number] [FreeTextEntry5] : 1.5 [FreeTextEntry6] : 30 [FreeTextEntry7] : 500 [FreeTextEntry8] : 30 [FreeTextEntry9] : 5.0 [de-identified] : 1.260 [de-identified] : 500 [de-identified] : 30 [de-identified] : 1.75 [de-identified] : 680 [de-identified] : 60 [FreeTextEntry4] : \par

## 2022-07-15 NOTE — ASSESSMENT
[FreeTextEntry1] : 31 year old male with severe neurocognitive delay and frequent partial seizures with multifocal onsets, intractable to multiple medication trials. He underwent VNS implantation with good response in 2017. He was noted at a routine outpatient visit to have a decreased battery life and  pending end of life.  He then underwent VNS battery change on 6/30/22. Wound is well in process of healing. No sign of infection, inflammation or skin breakage noted. VNS interrogated: battery, lead impedance OK\par \par Plan:\par - Follow up with Dr. Keith/RAY Zhang for further titration\par - RTC as needed\par \par

## 2022-08-18 ENCOUNTER — RX RENEWAL (OUTPATIENT)
Age: 32
End: 2022-08-18

## 2022-09-26 ENCOUNTER — APPOINTMENT (OUTPATIENT)
Dept: NEUROLOGY | Facility: CLINIC | Age: 32
End: 2022-09-26

## 2022-09-26 VITALS
WEIGHT: 133 LBS | HEART RATE: 69 BPM | SYSTOLIC BLOOD PRESSURE: 102 MMHG | HEIGHT: 66 IN | BODY MASS INDEX: 21.38 KG/M2 | DIASTOLIC BLOOD PRESSURE: 68 MMHG

## 2022-09-26 PROCEDURE — 99213 OFFICE O/P EST LOW 20 MIN: CPT

## 2022-09-26 PROCEDURE — 95976 ALYS SMPL CN NPGT PRGRMG: CPT

## 2022-09-27 NOTE — PROCEDURE
[FreeTextEntry5] : 1.75 [FreeTextEntry6] : 30 [FreeTextEntry7] : 500 [FreeTextEntry8] : 30 [FreeTextEntry9] : 5.0 [de-identified] : 2.00 [de-identified] : 500 [de-identified] : 60 [de-identified] : 1.872 [de-identified] : 525 [de-identified] : 60 [de-identified] : 114 [de-identified] : 8 [de-identified] : 30->40% [FreeTextEntry4] : VNS Information: [employer] Model 220 : LOT number [id number]\par

## 2022-09-27 NOTE — HISTORY OF PRESENT ILLNESS
[FreeTextEntry1] : ***UPDATE:9/26/2022***\par Mr Neil Savage is here today for a scheduled follow up office visit and is accompanied by his sister\par He is s/p VNS battery change on 6/30/22\par \par He is doing well with no reported interval seizures\par \par Carbamazepine 200 mg BID\par Levetiracetam 1000mg one tab in am and two tabs in pm\par Phenobarbital 97.2 mg BID\par \par ***UPDATE:4/15/2022***\par Mr Niranjan Savage is here today for a scheduled follow up office visit and ia accompanied by his sister\par He is doing well with minimal seizures. Mother reports when he is stressed or upset he may have a brief tonic seizure\par VNS battery at 11-25%\par \par \par Keppra 1000/2000\par Carbamazepine 200 mg BID\par \par Telemedicine Visit Note:\par \par Patient consented to discussion of medical condition via remote telehealth from [address] \[place] [address] .  Visit conducted in this manner due to the current pandemic by Doctor ZORAN ABARCA from remote location. Due to the coronavirus outbreak, we are attempting to mitigate exposure to vulnerable patients at risk for a face to face/elective visit.  We will plan to move the scheduled appointments for now towards the summer as feasible, or forward to the next scheduled interval if the patient is stable.  Medication supply and refills were addressed.  \par Discussed with patient current degree of clinical stability.  Patient/caregiver were given opportunity to discuss questions, which were answered. [phone] \par x min=21\par \par Current AED: no recent Ca/Vit D. Carbamazepine 200mg q12h, Keppra 1000mg am/2000mg pm, Phenobarbital 97.2mg BID\par Trials:  Past treatment has included carbamazepine (Tegretol), divalproex (Depakote), levetiracetam (Keppra) and phenobarbital. \par \par UPDATE:  COVID19 resolved +abs.\par Patient accompanied by mother. He is doing very well with seizures, dec by 60%.\par (prev brief tonic seizures when stressed, fatigue, or excited), few per week\par His mother is very happy with the VNS, AED regimen\par No ADRs \par Due for bone density.\par Some tantrum/behavioral changes last month, mom requesting sedative.\par \par PMHX: 29 yo M with history MR/CP presents for follow up of medically refractory generalized epilepsy. Pt is status post VNS implantation on 5/25/17.\par Since then he has had a "60% decrease" in seizure frequency/severity. Mother is happy with results\par GTC description with associated tongue biting.  mainly nocturnal lasting 20-40 seconds described as generalized shaking with mouth drooling. Frequency is twice a night at baseline. \par \par The patient started having seizures shortly after birth. His "midwife tried giving him pills" which led to hypoxic injury. \par

## 2022-09-27 NOTE — DISCUSSION/SUMMARY
[Safety Recommendations] : The patient was advised in regards to the risk of seizures and general seizure safety recommendations including not to be bathing alone, climbing to high places and operating heavy machinery. [Compliance with Medications] : The importance of compliance with medications was reinforced. [Medication Side Effects] : High frequency and serious potential medication adverse effects were reviewed with the patient, including but not exclusive to psychiatric effects.  Information sheets on medication side effects were made available to the patient in our clinic.  The patient or advocate agrees to notify us for any concerns. [Sleep Hygiene/Sleep Disruption Risks] : Sleep hygiene and the risks of sleep disruption were discussed. [Risk of Death] : Risk of death associated with seizures / SUDEP was discussed. [FreeTextEntry1] : 32 year old man with history MR/CP w/ refractory epilepsy.\par EEG monitoring showed One generalized tonic seizure recorded with poor lateralization 2/2017. 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 There is minimal genu and splenium remnant of the corpus callosum.  s/p VNS implant 5/2017 with good results /VNS battery change 6/30/2022\par \par Spasticity \par Some self injurious behaviors.\par \par Plan:\par - continue current AED regimen\par - reviewed seizure triggers\par -annual labwork\par -  VNS adjusted see flow sheet\par -follow up in 4-6 months\par \par \par xtime 21min

## 2022-09-27 NOTE — DATA REVIEWED
[de-identified] : EEG monitoring showed One generalized tonic seizure recorded with poor lateralization 2/2017

## 2022-10-12 ENCOUNTER — RX RENEWAL (OUTPATIENT)
Age: 32
End: 2022-10-12

## 2022-11-16 ENCOUNTER — RX RENEWAL (OUTPATIENT)
Age: 32
End: 2022-11-16

## 2022-12-06 ENCOUNTER — RX RENEWAL (OUTPATIENT)
Age: 32
End: 2022-12-06

## 2022-12-15 ENCOUNTER — RX RENEWAL (OUTPATIENT)
Age: 32
End: 2022-12-15

## 2023-01-27 ENCOUNTER — APPOINTMENT (OUTPATIENT)
Dept: NEUROLOGY | Facility: CLINIC | Age: 33
End: 2023-01-27
Payer: MEDICARE

## 2023-01-27 PROCEDURE — 99213 OFFICE O/P EST LOW 20 MIN: CPT | Mod: 95

## 2023-01-27 RX ORDER — DIAZEPAM 10 MG/100UL
10 SPRAY NASAL
Qty: 1 | Refills: 0 | Status: ACTIVE | COMMUNITY
Start: 2023-01-27 | End: 1900-01-01

## 2023-01-27 RX ORDER — LORAZEPAM 1 MG/1
1 TABLET ORAL
Qty: 15 | Refills: 0 | Status: DISCONTINUED | COMMUNITY
Start: 2021-06-15 | End: 2023-01-27

## 2023-01-27 RX ORDER — DIAZEPAM 10 MG/100UL
10 SPRAY NASAL
Qty: 2 | Refills: 0 | Status: COMPLETED | COMMUNITY
Start: 2023-01-27 | End: 2023-01-27

## 2023-01-27 NOTE — PHYSICAL EXAM
[FreeTextEntry1] : Exam limited via telehealth:\par Constitutional: alert and in no acute distress. \par Neurologic: \par Cranial Nerves:   visual acuity poor, but can  make out face of mom.  pupils equal round and reactive to light, extraocular motion intact, facial sensation intact symmetrically, face symmetrical, hearing was intact bilaterally, tongue and palate midline, head turning and shoulder shrug symmetric and there was no tongue deviation with protrusion. \par Motor Strength:. there was weakness in both upper extremities. there was weakness in both lower extremities. spasticity\par Sensory exam: deferred\par Coordination: Non-ambulatory. \par Deep tendon reflexes: NT\par

## 2023-01-27 NOTE — HISTORY OF PRESENT ILLNESS
[FreeTextEntry1] : Telemedicine Visit Note:\par \par Patient consented to discussion of medical condition via remote telehealth from home [address]\[place] [address] .  Visit conducted in this manner due to the current pandemic by Doctor ZORAN ABARCA from remote location. Due to the coronavirus outbreak, we are attempting to mitigate exposure to vulnerable patients at risk for a face to face/elective visit.  We will plan to move the scheduled in person appointments  forward to the next scheduled interval if the patient is stable.  Medication supply and refills were addressed.  Discussed with patient current degree of clinical stability.  Patient/caregiver were given opportunity to discuss questions, which were answered. [phone] \par x min=21\par \par Current AED: no recent Ca/Vit D. Carbamazepine 200mg q12h, Keppra 1000mg am/2000mg pm, Phenobarbital 97.2mg BID\par Trials:  Past treatment has included carbamazepine (Tegretol), divalproex (Depakote), levetiracetam (Keppra) and phenobarbital. \par \par UPDATE:\par Accompanied by his sister\par s/p VNS battery change on 6/30/22\par He is better p VNS change.  Some intermittent seizures. Mother reports when he is stressed or upset he may have a brief tonic seizure, but may be "less interactive" x 30min\par (Recent Baseline: prev brief tonic seizures when stressed, fatigue, or excited), few per (3-4x) week\par \par 2022 COVID19 resolved +abs.\par His mother is very happy with the VNS, AED regimen\par No ADRs \par Some tantrum/behavioral issues intermittent\par \par PMHX: 34 yo M with history MR/CP presents for follow up of medically refractory generalized epilepsy. Pt is status post VNS implantation on 5/25/17.  s/p VNS battery change on 6/30/22\par Since then he has had a "60% decrease" in seizure frequency/severity. Mother is happy with results\par GTC description with associated tongue biting.  mainly nocturnal lasting 20-40 seconds described as generalized shaking with mouth drooling. Frequency is twice a night at baseline. \par \par The patient started having seizures shortly after birth. His "midwife tried giving him pills" which led to hypoxic injury. \par

## 2023-01-27 NOTE — DISCUSSION/SUMMARY
[Safety Recommendations] : The patient was advised in regards to the risk of seizures and general seizure safety recommendations including not to be bathing alone, climbing to high places and operating heavy machinery. [Compliance with Medications] : The importance of compliance with medications was reinforced. [Medication Side Effects] : High frequency and serious potential medication adverse effects were reviewed with the patient, including but not exclusive to psychiatric effects.  Information sheets on medication side effects were made available to the patient in our clinic.  The patient or advocate agrees to notify us for any concerns. [Sleep Hygiene/Sleep Disruption Risks] : Sleep hygiene and the risks of sleep disruption were discussed. [Risk of Death] : Risk of death associated with seizures / SUDEP was discussed. [FreeTextEntry1] : 32 year old man with history MR/CP w/ refractory epilepsy.\par EEG monitoring showed One generalized tonic seizure recorded with poor lateralization 2/2017. 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 There is minimal genu and splenium remnant of the corpus callosum.  s/p VNS implant 5/2017 with good results /VNS battery change 6/30/2022\par \par Spasticity \par Some self injurious / behavioral issues.\par \par Plan:\par - continue current AED regimen\par - reviewed seizure triggers\par - annual labwork\par - VNS assessment at next visit in person\par - valtoco (lower dose 10mg prn for prolonged sz)\par - vit D 50k IU qweek, bone density\par \par - follow up in 4-6 months\par \par x time 21min

## 2023-01-27 NOTE — DATA REVIEWED
[de-identified] : EEG monitoring showed One generalized tonic seizure recorded with poor lateralization 2/2017

## 2023-01-27 NOTE — PROCEDURE
[FreeTextEntry5] : 1.75 [FreeTextEntry6] : 30 [FreeTextEntry7] : 500 [FreeTextEntry8] : 30 [FreeTextEntry9] : 5.0 [de-identified] : 2.00 [de-identified] : 500 [de-identified] : 60 [de-identified] : 1.877 [de-identified] : 494 [de-identified] : 60 [de-identified] : 114 [de-identified] : 8 [de-identified] : 30->40% [FreeTextEntry4] : VNS Information: [employer] Model 220 : LOT number [id number]\par

## 2023-04-12 ENCOUNTER — RX RENEWAL (OUTPATIENT)
Age: 33
End: 2023-04-12

## 2023-04-24 ENCOUNTER — RX RENEWAL (OUTPATIENT)
Age: 33
End: 2023-04-24

## 2023-07-25 ENCOUNTER — RX RENEWAL (OUTPATIENT)
Age: 33
End: 2023-07-25

## 2023-10-20 ENCOUNTER — RX RENEWAL (OUTPATIENT)
Age: 33
End: 2023-10-20

## 2023-10-27 ENCOUNTER — RX RENEWAL (OUTPATIENT)
Age: 33
End: 2023-10-27

## 2023-10-27 RX ORDER — CHLORHEXIDINE GLUCONATE 4 %
5 LIQUID (ML) TOPICAL
Qty: 180 | Refills: 0 | Status: ACTIVE | COMMUNITY
Start: 2023-07-25 | End: 1900-01-01

## 2023-12-12 ENCOUNTER — APPOINTMENT (OUTPATIENT)
Dept: RADIOLOGY | Facility: CLINIC | Age: 33
End: 2023-12-12

## 2024-02-07 ENCOUNTER — RX RENEWAL (OUTPATIENT)
Age: 34
End: 2024-02-07

## 2024-03-19 ENCOUNTER — APPOINTMENT (OUTPATIENT)
Dept: NEUROLOGY | Facility: CLINIC | Age: 34
End: 2024-03-19
Payer: MEDICARE

## 2024-03-19 DIAGNOSIS — G40.419 OTHER GENERALIZED EPILEPSY AND EPILEPTIC SYNDROMES, INTRACTABLE, W/OUT STATUS EPILEPTICUS: ICD-10-CM

## 2024-03-19 DIAGNOSIS — R45.1 RESTLESSNESS AND AGITATION: ICD-10-CM

## 2024-03-19 DIAGNOSIS — G47.9 SLEEP DISORDER, UNSPECIFIED: ICD-10-CM

## 2024-03-19 DIAGNOSIS — G40.909 EPILEPSY, UNSPECIFIED, NOT INTRACTABLE, W/OUT STATUS EPILEPTICUS: ICD-10-CM

## 2024-03-19 PROCEDURE — 99213 OFFICE O/P EST LOW 20 MIN: CPT

## 2024-03-19 PROCEDURE — G2211 COMPLEX E/M VISIT ADD ON: CPT

## 2024-03-19 RX ORDER — ZOLPIDEM TARTRATE 5 MG/1
5 TABLET ORAL
Qty: 20 | Refills: 5 | Status: DISCONTINUED | COMMUNITY
Start: 2018-05-11 | End: 2024-03-19

## 2024-03-19 RX ORDER — MELATONIN 5 MG
5 CAPSULE ORAL
Qty: 90 | Refills: 1 | Status: DISCONTINUED | COMMUNITY
Start: 2018-05-11 | End: 2024-03-19

## 2024-03-19 NOTE — DISCUSSION/SUMMARY
[FreeTextEntry1] : 33 year old man with history MR/CP w/ refractory epilepsy. EEG monitoring showed One generalized tonic seizure recorded with poor lateralization 2/2017. 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 There is minimal genu and splenium remnant of the corpus callosum.  s/p VNS implant 5/2017 with good results / VNS battery change 6/30/2022 Sleep issues chronic Spasticity  Some self injurious / behavioral issues, oppositional/impulsive.  Plan: - seroquel prn for sleep and labile agitated behaviors of concern lately from family not responding to behavioral measures - continue current AED regimen : consider change in Carbamazepine 200mg q12  to alt ASM in near future, ie trial of broader spectrum ASM ie PER, ZNS, ?RUF, ?LAC in it's place - VNS assessment at next visit in person, consider titration of settings if tolerable - annual labwork - valtoco (lower dose 10mg prn for prolonged sz) - vit D 50k IU qweek, bone density  - follow up in 4-6 months  x time 21min [Safety Recommendations] : The patient was advised in regards to the risk of seizures and general seizure safety recommendations including not to be bathing alone, climbing to high places and operating heavy machinery. [Compliance with Medications] : The importance of compliance with medications was reinforced. [Sleep Hygiene/Sleep Disruption Risks] : Sleep hygiene and the risks of sleep disruption were discussed. [Medication Side Effects] : High frequency and serious potential medication adverse effects were reviewed with the patient, including but not exclusive to psychiatric effects.  Information sheets on medication side effects were made available to the patient in our clinic.  The patient or advocate agrees to notify us for any concerns. [Risk of Death] : Risk of death associated with seizures / SUDEP was discussed.

## 2024-03-19 NOTE — HISTORY OF PRESENT ILLNESS
[FreeTextEntry1] : Telemedicine Visit Note:  Patient consented to discussion of medical condition via remote telehealth from home [address] [facility] [place] [address] .  Visit conducted in this manner due to the current pandemic by Doctor ZORAN ABARCA from remote location. Due to the coronavirus outbreak, we are attempting to mitigate exposure to vulnerable patients at risk for a face to face/elective visit.  We will plan to move the scheduled in person appointments  forward to the next scheduled interval if the patient is stable.  Medication supply and refills were addressed.  Discussed with patient current degree of clinical stability.  Patient/caregiver were given opportunity to discuss questions, which were answered. [phone]  x min=21  Current AED: no recent Ca/Vit D. Carbamazepine 200mg q12h, Keppra 1000mg am/2000mg pm, Phenobarbital 97.2mg BID, melantonin Trials:  Past treatment has included carbamazepine (Tegretol), divalproex (Depakote), levetiracetam (Keppra) and phenobarbital. too tired after ambien  UPDATE:  seen with sis on video s/p VNS battery change on 6/30/22 He is better p VNS change.  Some intermittent seizures. Mother reports when he is stressed or upset he may have a brief tonic seizure, but may be "less interactive" x 30min (Recent Baseline: prev brief tonic seizures when stressed, fatigue, or excited), few per (3-4x) week can cluster 1-3x/day  2022 COVID19 resolved +abs. His mother is generally very happy with the VNS, AED regimen No ADRs   ROS: wt stable Some tantrum/behavioral issues intermittent may be labile, may appear weepy or angry easily. Recent worsening of behavior x 2-3mo since 1/2024, gmo with CTX for breast CA  PMHX: 34 yo M with history MR/CP presents for follow up of medically refractory generalized epilepsy. Pt is status post VNS implantation on 5/25/17.  s/p VNS battery change on 6/30/22 Since then he has had a "60% decrease" in seizure frequency/severity. Mother is happy with results GTC description with associated tongue biting.  mainly nocturnal lasting 20-60 seconds described as generalized shaking with mouth drooling. Frequency is twice a night at baseline.   The patient started having seizures shortly after birth. His "midwife tried giving him pills" which led to hypoxic injury.

## 2024-03-19 NOTE — PROCEDURE
[FreeTextEntry5] : 1.75 [FreeTextEntry6] : 30 [FreeTextEntry7] : 500 [FreeTextEntry8] : 30 [FreeTextEntry9] : 5.0 [de-identified] : 2.00 [de-identified] : 500 [de-identified] : 60 [de-identified] : 1.870 [de-identified] : 245 [de-identified] : 60 [de-identified] : 8 [de-identified] : 114 [de-identified] : 30->40% [FreeTextEntry4] : VNS Information: [employer] Model 220 : LOT number [id number]

## 2024-03-19 NOTE — DATA REVIEWED
[de-identified] : EEG monitoring showed One generalized tonic seizure recorded with poor lateralization 2/2017

## 2024-04-15 ENCOUNTER — RX RENEWAL (OUTPATIENT)
Age: 34
End: 2024-04-15

## 2024-05-01 ENCOUNTER — RX RENEWAL (OUTPATIENT)
Age: 34
End: 2024-05-01

## 2024-05-01 RX ORDER — PHENOBARBITAL 97.2 MG/1
97.2 TABLET ORAL
Qty: 180 | Refills: 1 | Status: ACTIVE | COMMUNITY
Start: 2024-02-07 | End: 1900-01-01

## 2024-05-01 RX ORDER — QUETIAPINE FUMARATE 25 MG/1
25 TABLET ORAL
Qty: 180 | Refills: 1 | Status: ACTIVE | COMMUNITY
Start: 2024-03-19 | End: 1900-01-01

## 2024-07-29 ENCOUNTER — RX RENEWAL (OUTPATIENT)
Age: 34
End: 2024-07-29

## 2024-08-26 NOTE — ASU PREOP CHECKLIST - NS PREOP CHK HIBICLENS NA
Phone call from patient returning call to Valery (no notation in chart - unable to assist patient further). Attempted to call Buzz Henson (no answer). Informed patient office will call back. Patient agreeable.    Please review chart & contact patient at [phone].   N/A

## 2024-12-26 ENCOUNTER — RX RENEWAL (OUTPATIENT)
Age: 34
End: 2024-12-26

## 2025-01-07 NOTE — REASON FOR VISIT
----- Message from Mara Street MD sent at 1/6/2025 10:16 PM CST -----  Fyi patient in er will discuss at next visit   [Follow-Up: _____] : a [unfilled] follow-up visit [Parent] : parent

## 2025-01-25 ENCOUNTER — RX RENEWAL (OUTPATIENT)
Age: 35
End: 2025-01-25

## 2025-03-13 NOTE — H&P PST ADULT - AIRWAY
No
no problems swallowing food, regular diet/normal
Qbrexza Pregnancy And Lactation Text: There is no available data on Qbrexza use in pregnant women.  There is no available data on Qbrexza use in lactation.

## (undated) DEVICE — DRSG DERMABOND 0.7ML

## (undated) DEVICE — PACK LUMBAR LAMI

## (undated) DEVICE — ELCTR SUBDERMAL CORKSCREW NDL 1.2MM

## (undated) DEVICE — DRAPE INSTRUMENT POUCH 6.75" X 11"

## (undated) DEVICE — SUT NUROLON 4-0 8-18" TF (POP-OFF)

## (undated) DEVICE — Device

## (undated) DEVICE — ELCTR PEDICLE SCREW PROBE 3MM BALL 1.8MM X 100MM

## (undated) DEVICE — DRAPE 1/2 SHEET 40X57"

## (undated) DEVICE — GOWN SLEEVES

## (undated) DEVICE — ELCTR SUBDERMAL NDL 27G X 1/2" WITH TWISTED PAIR

## (undated) DEVICE — WARMING BLANKET FULL ADULT

## (undated) DEVICE — VESSEL LOOP MAXI-RED  0.120" X 16"

## (undated) DEVICE — VISITEC 4X4

## (undated) DEVICE — SUT VICRYL 3-0 18" X-1 (POP-OFF)

## (undated) DEVICE — DRSG STERISTRIPS 0.5 X 4"

## (undated) DEVICE — SUT VICRYL 2-0 18" CP-2 UNDYED (POP-OFF)

## (undated) DEVICE — GOWN TRIMAX LG

## (undated) DEVICE — GLV 7 PROTEXIS (WHITE)

## (undated) DEVICE — DRSG TEGADERM 1.75X1.75"

## (undated) DEVICE — DRAPE TOWEL BLUE 17" X 24"

## (undated) DEVICE — BLADE SCALPEL SAFETYLOCK #11

## (undated) DEVICE — GLV 7.5 PROTEXIS (WHITE)

## (undated) DEVICE — DRAPE MAYO STAND 30"

## (undated) DEVICE — DRAPE MICROSCOPE OPMI VISIONGUARD 48X118"

## (undated) DEVICE — BLADE SCALPEL SAFETYLOCK #15

## (undated) DEVICE — DRSG TEGADERM 4X4.75"

## (undated) DEVICE — MARKING PEN W RULER

## (undated) DEVICE — ELCTR BIPOLAR PROBE

## (undated) DEVICE — WARMING BLANKET LOWER ADULT

## (undated) DEVICE — SOL IRR POUR H2O 250ML

## (undated) DEVICE — STAPLER SKIN VISI-STAT 35 WIDE

## (undated) DEVICE — GLV 6 PROTEXIS (WHITE)

## (undated) DEVICE — DRSG TELFA 3 X 8

## (undated) DEVICE — PREP CHLORAPREP HI-LITE ORANGE 26ML

## (undated) DEVICE — DRAPE MICROSCOPE OPMI VISIONGUARD 48X82"

## (undated) DEVICE — ELCTR 4-DISC 20MM 49" (RED, BLUE, GREEN, BLACK)

## (undated) DEVICE — FOLEY TRAY 16FR 5CC LTX UMETER CLOSED

## (undated) DEVICE — SYR ASEPTO

## (undated) DEVICE — DRAPE LIGHT HANDLE COVER (BLUE)

## (undated) DEVICE — ELCTR MONOPOLAR STIMULATOR PROBE FLUSH-TIP

## (undated) DEVICE — DRSG MASTISOL

## (undated) DEVICE — DRSG CURITY GAUZE SPONGE 4 X 4" 12-PLY

## (undated) DEVICE — POSITIONER FOAM EGG CRATE ULNAR 2PCS (PINK)

## (undated) DEVICE — GLV 8.5 PROTEXIS (WHITE)

## (undated) DEVICE — SPECIMEN CONTAINER 100ML

## (undated) DEVICE — TUBING SUCTION 20FT

## (undated) DEVICE — LAP PAD 18 X 18"

## (undated) DEVICE — ELCTR SUBDERMAL NDL CLASSIC 1.5M X 59" (6 COLOR)

## (undated) DEVICE — SOL IRR POUR NS 0.9% 500ML

## (undated) DEVICE — ELCTR BOVIE TIP BLADE INSULATED 2.75" EDGE

## (undated) DEVICE — GLV 6.5 PROTEXIS (WHITE)

## (undated) DEVICE — DRAPE CAMERA VIDEO 7"X96"

## (undated) DEVICE — FOLEY HOLDER STATLOCK 2 WAY ADULT

## (undated) DEVICE — VENODYNE/SCD SLEEVE CALF MEDIUM

## (undated) DEVICE — TUBING BIPOLAR IRRIGATOR AND CORD SET

## (undated) DEVICE — BLADE SCALPEL SAFETYLOCK #10

## (undated) DEVICE — ELCTR BIPOLAR CORD J&J 12FT DISP

## (undated) DEVICE — GLV 8 PROTEXIS (WHITE)

## (undated) DEVICE — SYR LUER LOK 10CC